# Patient Record
Sex: MALE | Race: WHITE | NOT HISPANIC OR LATINO | Employment: FULL TIME | URBAN - METROPOLITAN AREA
[De-identification: names, ages, dates, MRNs, and addresses within clinical notes are randomized per-mention and may not be internally consistent; named-entity substitution may affect disease eponyms.]

---

## 2019-10-16 ENCOUNTER — OFFICE VISIT CONVERTED (OUTPATIENT)
Dept: FAMILY MEDICINE CLINIC | Facility: CLINIC | Age: 49
End: 2019-10-16
Attending: NURSE PRACTITIONER

## 2019-10-16 ENCOUNTER — HOSPITAL ENCOUNTER (OUTPATIENT)
Dept: LAB | Facility: HOSPITAL | Age: 49
Discharge: HOME | End: 2019-10-16
Attending: NURSE PRACTITIONER

## 2019-10-16 ENCOUNTER — CONVERSION ENCOUNTER (OUTPATIENT)
Dept: FAMILY MEDICINE CLINIC | Facility: CLINIC | Age: 49
End: 2019-10-16

## 2019-10-16 LAB
ALBUMIN SERPL-MCNC: 4.5 G/DL (ref 3.5–5)
ALBUMIN/GLOB SERPL: 1.7 {RATIO} (ref 1.4–2.6)
ALP SERPL-CCNC: 85 U/L (ref 53–128)
ALT SERPL-CCNC: 25 U/L (ref 10–40)
ANION GAP SERPL CALC-SCNC: 19 MMOL/L (ref 8–19)
AST SERPL-CCNC: 26 U/L (ref 15–50)
BASOPHILS # BLD AUTO: 0.05 10*3/UL (ref 0–0.2)
BASOPHILS NFR BLD AUTO: 0.7 % (ref 0–3)
BILIRUB SERPL-MCNC: 0.55 MG/DL (ref 0.2–1.3)
BUN SERPL-MCNC: 15 MG/DL (ref 5–25)
BUN/CREAT SERPL: 17 {RATIO} (ref 6–20)
CALCIUM SERPL-MCNC: 9.7 MG/DL (ref 8.7–10.4)
CHLORIDE SERPL-SCNC: 103 MMOL/L (ref 99–111)
CHOLEST SERPL-MCNC: 233 MG/DL (ref 107–200)
CHOLEST/HDLC SERPL: 5.2 {RATIO} (ref 3–6)
CONV ABS IMM GRAN: 0.03 10*3/UL (ref 0–0.2)
CONV CO2: 23 MMOL/L (ref 22–32)
CONV IMMATURE GRAN: 0.4 % (ref 0–1.8)
CONV TOTAL PROTEIN: 7.1 G/DL (ref 6.3–8.2)
CREAT UR-MCNC: 0.86 MG/DL (ref 0.7–1.2)
DEPRECATED RDW RBC AUTO: 40.6 FL (ref 35.1–43.9)
EOSINOPHIL # BLD AUTO: 0.24 10*3/UL (ref 0–0.7)
EOSINOPHIL # BLD AUTO: 3.4 % (ref 0–7)
ERYTHROCYTE [DISTWIDTH] IN BLOOD BY AUTOMATED COUNT: 12.5 % (ref 11.6–14.4)
GFR SERPLBLD BASED ON 1.73 SQ M-ARVRAT: >60 ML/MIN/{1.73_M2}
GLOBULIN UR ELPH-MCNC: 2.6 G/DL (ref 2–3.5)
GLUCOSE SERPL-MCNC: 89 MG/DL (ref 70–99)
HCT VFR BLD AUTO: 46.2 % (ref 42–52)
HDLC SERPL-MCNC: 45 MG/DL (ref 40–60)
HGB BLD-MCNC: 15.1 G/DL (ref 14–18)
LDLC SERPL CALC-MCNC: 145 MG/DL (ref 70–100)
LYMPHOCYTES # BLD AUTO: 2.53 10*3/UL (ref 1–5)
LYMPHOCYTES NFR BLD AUTO: 36.1 % (ref 20–45)
MCH RBC QN AUTO: 29 PG (ref 27–31)
MCHC RBC AUTO-ENTMCNC: 32.7 G/DL (ref 33–37)
MCV RBC AUTO: 88.8 FL (ref 80–96)
MONOCYTES # BLD AUTO: 0.56 10*3/UL (ref 0.2–1.2)
MONOCYTES NFR BLD AUTO: 8 % (ref 3–10)
NEUTROPHILS # BLD AUTO: 3.59 10*3/UL (ref 2–8)
NEUTROPHILS NFR BLD AUTO: 51.4 % (ref 30–85)
NRBC CBCN: 0 % (ref 0–0.7)
OSMOLALITY SERPL CALC.SUM OF ELEC: 292 MOSM/KG (ref 273–304)
PLATELET # BLD AUTO: 241 10*3/UL (ref 130–400)
PMV BLD AUTO: 10.5 FL (ref 9.4–12.4)
POTASSIUM SERPL-SCNC: 4.2 MMOL/L (ref 3.5–5.3)
PSA SERPL-MCNC: 1.32 NG/ML (ref 0–4)
RBC # BLD AUTO: 5.2 10*6/UL (ref 4.7–6.1)
SODIUM SERPL-SCNC: 141 MMOL/L (ref 135–147)
TRIGL SERPL-MCNC: 213 MG/DL (ref 40–150)
VLDLC SERPL-MCNC: 43 MG/DL (ref 5–37)
WBC # BLD AUTO: 7 10*3/UL (ref 4.8–10.8)

## 2019-10-17 ENCOUNTER — HOSPITAL ENCOUNTER (OUTPATIENT)
Dept: MRI IMAGING | Facility: HOSPITAL | Age: 49
Discharge: HOME | End: 2019-10-17
Attending: NURSE PRACTITIONER

## 2019-10-25 ENCOUNTER — OFFICE VISIT CONVERTED (OUTPATIENT)
Dept: ORTHOPEDIC SURGERY | Facility: CLINIC | Age: 49
End: 2019-10-25
Attending: ORTHOPAEDIC SURGERY

## 2020-03-02 ENCOUNTER — OFFICE VISIT CONVERTED (OUTPATIENT)
Dept: FAMILY MEDICINE CLINIC | Facility: CLINIC | Age: 50
End: 2020-03-02
Attending: NURSE PRACTITIONER

## 2020-03-02 ENCOUNTER — CONVERSION ENCOUNTER (OUTPATIENT)
Dept: FAMILY MEDICINE CLINIC | Facility: CLINIC | Age: 50
End: 2020-03-02

## 2020-03-11 ENCOUNTER — HOSPITAL ENCOUNTER (OUTPATIENT)
Dept: GENERAL RADIOLOGY | Facility: HOSPITAL | Age: 50
Discharge: HOME | End: 2020-03-11
Attending: NURSE PRACTITIONER

## 2020-03-24 ENCOUNTER — OFFICE VISIT CONVERTED (OUTPATIENT)
Dept: ORTHOPEDIC SURGERY | Facility: CLINIC | Age: 50
End: 2020-03-24
Attending: ORTHOPAEDIC SURGERY

## 2020-06-09 ENCOUNTER — OFFICE VISIT CONVERTED (OUTPATIENT)
Dept: ORTHOPEDIC SURGERY | Facility: CLINIC | Age: 50
End: 2020-06-09
Attending: ORTHOPAEDIC SURGERY

## 2021-04-13 ENCOUNTER — HOSPITAL ENCOUNTER (OUTPATIENT)
Dept: VACCINE CLINIC | Facility: HOSPITAL | Age: 51
Discharge: HOME | End: 2021-04-13
Attending: INTERNAL MEDICINE

## 2021-05-04 ENCOUNTER — HOSPITAL ENCOUNTER (OUTPATIENT)
Dept: VACCINE CLINIC | Facility: HOSPITAL | Age: 51
Discharge: HOME | End: 2021-05-04
Attending: INTERNAL MEDICINE

## 2021-05-13 NOTE — PROGRESS NOTES
Progress Note      Patient Name: Mike Patel   Patient ID: [id number]   Sex: Male   YOB: 1970    Primary Care Provider: Katherine TOVAR   Referring Provider: Katherine TOVAR    Visit Date: June 9, 2020    Provider: Gera Canada MD   Location: [facility]   Location Address: [address] [place] [address]  [id number]   Location Phone: [phone]          Chief Complaint  · Follow up Left Shoulder Pain      History Of Present Illness  Mike Patel is a 50 year old /White male who presents today to [facility].      He's following up for left shoulder pain. Patient injured left biceps in October 2019 when he felt a pop changing a car filter. Patient received a left shoulder steroid injection on 3/24/2020 that provided pain relief. Patient states some pain with physician directed home exercises, but overall patient states he's improved. Patient is taking Voltaren. He works at [employer].            Past Medical History  Arthritis; Chest pain; Seasonal allergies         Past Surgical History  Hernia         Medication List  diclofenac sodium 75 mg oral tablet,delayed release (DR/EC); ibuprofen 800 mg oral tablet; loratadine 10 mg oral tablet; multivitamin oral capsule         Allergy List  amoxicillin         Family Medical History  Hodgskin's Lymphoma; *No Known Family History         Social History  Alcohol (Current some day); Alcohol Use; lives with children; lives with spouse; ; .; Recreational Drug Use (Never); Tobacco (Former); Working         Immunizations  Name Date Admin   Influenza          Review of Systems  · Constitutional  o Denies  o : fever, chills, weight loss  · Cardiovascular  o Denies  o : chest pain, shortness of breath  · Gastrointestinal  o Denies  o : liver disease, heartburn, nausea, blood in stools  · Genitourinary  o Denies  o : painful urination, blood in urine  · Integument  o Denies  o : rash, itching  · Neurologic  o Denies  o :  "headache, weakness, loss of consciousness  · Musculoskeletal  o Admits  o : painful, swollen joints  · Psychiatric  o Denies  o : drug/alcohol addiction, anxiety, depression      Vitals  Date Time BP Position Site L\R Cuff Size HR RR TEMP (F) WT  HT  BMI kg/m2 BSA m2 O2 Sat HC       06/09/2020 08:57 AM      79 - R   191lbs 6oz 5'  7\" 29.97 2.03 98 %          Physical Examination  · Constitutional  o Appearance  o : well developed, well-nourished, no obvious deformities present  · Head and Face  o Head  o :   § Inspection  § : normocephalic  o Face  o :   § Inspection  § : no facial lesions  · Eyes  o Conjunctivae  o : conjunctivae normal  o Sclerae  o : sclerae white  · Ears, Nose, Mouth and Throat  o Ears  o :   § External Ears  § : appearance within normal limits  § Hearing  § : intact  o Nose  o :   § External Nose  § : appearance normal  · Neck  o Inspection/Palpation  o : normal appearance  o Range of Motion  o : full range of motion  · Respiratory  o Respiratory Effort  o : breathing unlabored  o Inspection of Chest  o : normal appearance  o Auscultation of Lungs  o : no audible wheezing or rales  · Cardiovascular  o Heart  o : regular rate  · Gastrointestinal  o Abdominal Examination  o : soft and non-tender  · Skin and Subcutaneous Tissue  o General Inspection  o : intact, no rashes  · Psychiatric  o General  o : Alert and oriented x3  o Judgement and Insight  o : judgment and insight intact  o Mood and Affect  o : mood normal, affect appropriate  · Left Shoulder  o Inspection  o : Forward elevation is near-full. Limited ER with stiffness. Pain with ER. Neurovascularly intact. Sensation grossly intact. Pulses normal.   · Imaging  o Imaging  o : Left shoulder MRI performed on 3/11/2020 revealed 1) Evidence for partial thickness undersurface and intrasubstance tear involving the superior fibers of the subscapularis component of the cuff. No definitive full-thickness rotator cuff tear is seen. 2) There is " complete tear of the biceps tendon likely occurring at the biceps labral complex. There is distal retraction of the torn fibers below the intertubercular sulcus. 3) Evidence for chronic or degenerative type changes throughout the labrum. There is associated mild osteoarthritis of the glenohumeral joint. 4) Findings most suggestive of changes related to adhesive capsulitis. Findings secondary to prior trauma and partial injury of the inferior glenohumeral ligament and joint capsule could also be considered.           Assessment  · Left shoulder pain, unspecified chronicity     719.41/M25.512      Plan  · Medications  o Medications have been Reconciled  o Transition of Care or Provider Policy  · Instructions  o Reviewed the patient's Past Medical, Social, and Family history as well as the ROS at today's visit, no changes.  o Call or return if worsening symptoms.  o The above service was scribed by Jennifer Hines on my behalf and I attest to the accuracy of the note. mc  o The plan is to continue home exercises and Voltaren. Follow-up PRN.   · Referrals  o ID: [id number] Date: 10/24/2019 Type: Inbound  Specialty: Orthopedic Surgery            Electronically Signed by: Audrey Hines - , Other -Author on June 9, 2020 09:10:02 AM  Electronically Co-signed by: Gera Canada MD -Reviewer on June 9, 2020 10:08:30 AM

## 2021-05-15 VITALS
OXYGEN SATURATION: 97 % | DIASTOLIC BLOOD PRESSURE: 77 MMHG | SYSTOLIC BLOOD PRESSURE: 108 MMHG | BODY MASS INDEX: 29.58 KG/M2 | HEART RATE: 87 BPM | WEIGHT: 188.5 LBS | HEIGHT: 67 IN

## 2021-05-15 VITALS — HEART RATE: 87 BPM | OXYGEN SATURATION: 96 % | HEIGHT: 67 IN | BODY MASS INDEX: 30.69 KG/M2 | WEIGHT: 195.5 LBS

## 2021-05-15 VITALS
SYSTOLIC BLOOD PRESSURE: 122 MMHG | WEIGHT: 188 LBS | HEART RATE: 68 BPM | OXYGEN SATURATION: 98 % | DIASTOLIC BLOOD PRESSURE: 87 MMHG | HEIGHT: 67 IN | BODY MASS INDEX: 29.51 KG/M2

## 2021-05-15 VITALS — HEIGHT: 67 IN | BODY MASS INDEX: 29.51 KG/M2 | WEIGHT: 188 LBS

## 2021-05-15 VITALS — WEIGHT: 191.37 LBS | HEART RATE: 79 BPM | HEIGHT: 67 IN | BODY MASS INDEX: 30.03 KG/M2 | OXYGEN SATURATION: 98 %

## 2021-06-17 ENCOUNTER — OFFICE VISIT (OUTPATIENT)
Dept: FAMILY MEDICINE CLINIC | Facility: CLINIC | Age: 51
End: 2021-06-17

## 2021-06-17 ENCOUNTER — LAB (OUTPATIENT)
Dept: LAB | Facility: HOSPITAL | Age: 51
End: 2021-06-17

## 2021-06-17 VITALS
SYSTOLIC BLOOD PRESSURE: 121 MMHG | DIASTOLIC BLOOD PRESSURE: 88 MMHG | WEIGHT: 194 LBS | OXYGEN SATURATION: 96 % | TEMPERATURE: 96.9 F | HEIGHT: 67 IN | BODY MASS INDEX: 30.45 KG/M2 | HEART RATE: 60 BPM

## 2021-06-17 DIAGNOSIS — Z12.11 SCREENING FOR COLON CANCER: Primary | ICD-10-CM

## 2021-06-17 DIAGNOSIS — Z23 NEED FOR TETANUS BOOSTER: ICD-10-CM

## 2021-06-17 DIAGNOSIS — Z13.29 SCREENING FOR THYROID DISORDER: ICD-10-CM

## 2021-06-17 DIAGNOSIS — Z01.89 ROUTINE LAB DRAW: ICD-10-CM

## 2021-06-17 DIAGNOSIS — Z13.220 SCREENING FOR LIPID DISORDERS: ICD-10-CM

## 2021-06-17 DIAGNOSIS — Z12.5 SCREENING FOR PROSTATE CANCER: ICD-10-CM

## 2021-06-17 LAB
25(OH)D3 SERPL-MCNC: 38.4 NG/ML
ALBUMIN SERPL-MCNC: 4.4 G/DL (ref 3.5–5.2)
ALBUMIN/GLOB SERPL: 2.3 G/DL
ALP SERPL-CCNC: 93 U/L (ref 39–117)
ALT SERPL W P-5'-P-CCNC: 21 U/L (ref 1–41)
ANION GAP SERPL CALCULATED.3IONS-SCNC: 6.8 MMOL/L (ref 5–15)
AST SERPL-CCNC: 24 U/L (ref 1–40)
BASOPHILS # BLD AUTO: 0.06 10*3/MM3 (ref 0–0.2)
BASOPHILS NFR BLD AUTO: 0.9 % (ref 0–1.5)
BILIRUB SERPL-MCNC: 0.5 MG/DL (ref 0–1.2)
BUN SERPL-MCNC: 15 MG/DL (ref 6–20)
BUN/CREAT SERPL: 17.2 (ref 7–25)
CALCIUM SPEC-SCNC: 8.7 MG/DL (ref 8.6–10.5)
CHLORIDE SERPL-SCNC: 106 MMOL/L (ref 98–107)
CHOLEST SERPL-MCNC: 230 MG/DL (ref 0–200)
CO2 SERPL-SCNC: 25.2 MMOL/L (ref 22–29)
CREAT SERPL-MCNC: 0.87 MG/DL (ref 0.76–1.27)
DEPRECATED RDW RBC AUTO: 41 FL (ref 37–54)
EOSINOPHIL # BLD AUTO: 0.3 10*3/MM3 (ref 0–0.4)
EOSINOPHIL NFR BLD AUTO: 4.6 % (ref 0.3–6.2)
ERYTHROCYTE [DISTWIDTH] IN BLOOD BY AUTOMATED COUNT: 12.9 % (ref 12.3–15.4)
GFR SERPL CREATININE-BSD FRML MDRD: 93 ML/MIN/1.73
GLOBULIN UR ELPH-MCNC: 1.9 GM/DL
GLUCOSE SERPL-MCNC: 82 MG/DL (ref 65–99)
HCT VFR BLD AUTO: 43.9 % (ref 37.5–51)
HDLC SERPL-MCNC: 43 MG/DL (ref 40–60)
HGB BLD-MCNC: 14.8 G/DL (ref 13–17.7)
IMM GRANULOCYTES # BLD AUTO: 0.02 10*3/MM3 (ref 0–0.05)
IMM GRANULOCYTES NFR BLD AUTO: 0.3 % (ref 0–0.5)
LDLC SERPL CALC-MCNC: 168 MG/DL (ref 0–100)
LDLC/HDLC SERPL: 3.86 {RATIO}
LYMPHOCYTES # BLD AUTO: 2.49 10*3/MM3 (ref 0.7–3.1)
LYMPHOCYTES NFR BLD AUTO: 38.5 % (ref 19.6–45.3)
MCH RBC QN AUTO: 29.3 PG (ref 26.6–33)
MCHC RBC AUTO-ENTMCNC: 33.7 G/DL (ref 31.5–35.7)
MCV RBC AUTO: 86.9 FL (ref 79–97)
MONOCYTES # BLD AUTO: 0.52 10*3/MM3 (ref 0.1–0.9)
MONOCYTES NFR BLD AUTO: 8 % (ref 5–12)
NEUTROPHILS NFR BLD AUTO: 3.07 10*3/MM3 (ref 1.7–7)
NEUTROPHILS NFR BLD AUTO: 47.7 % (ref 42.7–76)
NRBC BLD AUTO-RTO: 0 /100 WBC (ref 0–0.2)
PLATELET # BLD AUTO: 227 10*3/MM3 (ref 140–450)
PMV BLD AUTO: 10.2 FL (ref 6–12)
POTASSIUM SERPL-SCNC: 4.2 MMOL/L (ref 3.5–5.2)
PROT SERPL-MCNC: 6.3 G/DL (ref 6–8.5)
PSA SERPL-MCNC: 1.2 NG/ML (ref 0–4)
RBC # BLD AUTO: 5.05 10*6/MM3 (ref 4.14–5.8)
SODIUM SERPL-SCNC: 138 MMOL/L (ref 136–145)
TRIGL SERPL-MCNC: 106 MG/DL (ref 0–150)
TSH SERPL DL<=0.05 MIU/L-ACNC: 3.07 UIU/ML (ref 0.27–4.2)
VIT B12 BLD-MCNC: 820 PG/ML (ref 211–946)
VLDLC SERPL-MCNC: 19 MG/DL (ref 5–40)
WBC # BLD AUTO: 6.46 10*3/MM3 (ref 3.4–10.8)

## 2021-06-17 PROCEDURE — 84443 ASSAY THYROID STIM HORMONE: CPT

## 2021-06-17 PROCEDURE — 99396 PREV VISIT EST AGE 40-64: CPT | Performed by: NURSE PRACTITIONER

## 2021-06-17 PROCEDURE — 90471 IMMUNIZATION ADMIN: CPT | Performed by: NURSE PRACTITIONER

## 2021-06-17 PROCEDURE — 80061 LIPID PANEL: CPT

## 2021-06-17 PROCEDURE — 85025 COMPLETE CBC W/AUTO DIFF WBC: CPT

## 2021-06-17 PROCEDURE — 90715 TDAP VACCINE 7 YRS/> IM: CPT | Performed by: NURSE PRACTITIONER

## 2021-06-17 PROCEDURE — 80053 COMPREHEN METABOLIC PANEL: CPT

## 2021-06-17 PROCEDURE — 82306 VITAMIN D 25 HYDROXY: CPT

## 2021-06-17 PROCEDURE — G0103 PSA SCREENING: HCPCS

## 2021-06-17 PROCEDURE — 82607 VITAMIN B-12: CPT

## 2021-06-17 PROCEDURE — 36415 COLL VENOUS BLD VENIPUNCTURE: CPT

## 2021-06-17 RX ORDER — LORATADINE 10 MG/1
TABLET ORAL
COMMUNITY

## 2021-06-17 RX ORDER — MULTIVIT WITH MINERALS/LUTEIN
250 TABLET ORAL DAILY
COMMUNITY

## 2021-06-17 NOTE — PROGRESS NOTES
Chris Patel is a 51 y.o. male and is here for a comprehensive physical exam. The patient reports no problems.    Do you take any herbs or supplements that were not prescribed by a doctor? no  Are you taking calcium supplements? no  Are you taking aspirin daily? no     History:  Patient receives prostate care outside our clinic  Date last prostate exam: never  Date last PSA: 2019    The following portions of the patient's history were reviewed and updated as appropriate:   He  has a past medical history of Arthritis, Chest pain, Seasonal allergies, and Shoulder joint derangement (2019).  He does not have a problem list on file.  He  has a past surgical history that includes Hernia repair (1976).  His family history includes COPD in his maternal grandfather and mother; Hodgkin's lymphoma in his maternal grandmother.  He  reports that he has quit smoking. He quit smokeless tobacco use about 4 years ago.  His smokeless tobacco use included chew. He reports current alcohol use. He reports previous drug use.  Current Outpatient Medications   Medication Sig Dispense Refill   • vitamin C (ASCORBIC ACID) 250 MG tablet Take 250 mg by mouth Daily.     • loratadine (CLARITIN) 10 MG tablet loratadine 10 mg oral tablet take 1 tablet (10 mg) by oral route once daily   Active     • Multiple Vitamins-Minerals (MULTIVITAMIN GUMMIES MENS PO) multivitamin oral capsule take 1 capsule by oral route daily   Active       No current facility-administered medications for this visit.     Current Outpatient Medications on File Prior to Visit   Medication Sig   • vitamin C (ASCORBIC ACID) 250 MG tablet Take 250 mg by mouth Daily.   • loratadine (CLARITIN) 10 MG tablet loratadine 10 mg oral tablet take 1 tablet (10 mg) by oral route once daily   Active   • Multiple Vitamins-Minerals (MULTIVITAMIN GUMMIES MENS PO) multivitamin oral capsule take 1 capsule by oral route daily   Active     No current facility-administered  "medications on file prior to visit.     He is allergic to amoxicillin..    Review of Systems  Do you have pain that bothers you in your daily life? no  A comprehensive review of systems was negative.    Objective   /88 (BP Location: Left arm, Patient Position: Sitting, Cuff Size: Adult)   Pulse 60   Temp 96.9 °F (36.1 °C)   Ht 170.2 cm (67\")   Wt 88 kg (194 lb)   SpO2 96%   BMI 30.38 kg/m²     General Appearance:    Alert, cooperative, no distress, appears stated age   Head:    Normocephalic, without obvious abnormality, atraumatic   Eyes:    PERRL, conjunctiva/corneas clear, EOM's intact, fundi     benign, both eyes        Ears:    Normal TM's and external ear canals, both ears   Nose:   Nares normal, septum midline, mucosa normal, no drainage    or sinus tenderness   Throat:   Lips, mucosa, and tongue normal; teeth and gums normal   Neck:   Supple, symmetrical, trachea midline, no adenopathy;        thyroid:  No enlargement/tenderness/nodules; no carotid    bruit or JVD   Back:     Symmetric, no curvature, ROM normal, no CVA tenderness   Lungs:     Clear to auscultation bilaterally, respirations unlabored   Chest wall:    No tenderness or deformity   Heart:    Regular rate and rhythm, S1 and S2 normal, no murmur, rub   or gallop   Abdomen:     Soft, non-tender, bowel sounds active all four quadrants,     no masses, no organomegaly   Genitalia:    Normal male without lesion, discharge or tenderness   Rectal:    Normal tone, normal prostate, no masses or tenderness;    guaiac negative stool   Extremities:   Extremities normal, atraumatic, no cyanosis or edema   Pulses:   2+ and symmetric all extremities   Skin:   Skin color, texture, turgor normal, no rashes or lesions   Lymph nodes:   Cervical, supraclavicular, and axillary nodes normal   Neurologic:   CNII-XII intact. Normal strength, sensation and reflexes       throughout        Assessment/Plan   Healthy male exam.     1. Due annual labs-ordered " today.  2. Patient Counseling:  --Nutrition: Stressed importance of moderation in sodium/caffeine intake, saturated fat and cholesterol, caloric balance, sufficient intake of fresh fruits, vegetables, fiber, calcium, iron, and 1 mg of folate supplement per day (for females capable of pregnancy).  --Exercise: Stressed the importance of regular exercise.   --Substance Abuse: Discussed cessation/primary prevention of tobacco, alcohol, or other drug use; driving or other dangerous activities under the influence; availability of treatment for abuse.    --Sexuality: Discussed sexually transmitted diseases, partner selection, use of condoms, avoidance of unintended pregnancy  and contraceptive alternatives.   --Injury prevention: Discussed safety belts, safety helmets, smoke detector, smoking near bedding or upholstery.   --Dental health: Discussed importance of regular tooth brushing, flossing, and dental visits.  --Immunizations reviewed.  --Discussed benefits of screening colonoscopy.  --After hours service discussed with patient    3. Discussed the patient's BMI with him.  The BMI is above average; BMI management plan is completed  4. Follow up next physical in 1 year

## 2021-06-18 ENCOUNTER — TELEPHONE (OUTPATIENT)
Dept: FAMILY MEDICINE CLINIC | Facility: CLINIC | Age: 51
End: 2021-06-18

## 2021-06-18 DIAGNOSIS — E78.00 HIGH CHOLESTEROL: Primary | ICD-10-CM

## 2021-06-18 RX ORDER — ATORVASTATIN CALCIUM 10 MG/1
10 TABLET, FILM COATED ORAL DAILY
Qty: 30 TABLET | Refills: 3 | Status: SHIPPED | OUTPATIENT
Start: 2021-06-18 | End: 2022-03-07 | Stop reason: DRUGHIGH

## 2021-06-18 NOTE — TELEPHONE ENCOUNTER
----- Message from GUY Sierra sent at 6/17/2021 10:15 PM EDT -----  All labs normal w/exception of lipid-showed elevated chol and ldl-recommend starting lipitor 10mg qd and repeat lipid in 3m

## 2021-06-18 NOTE — TELEPHONE ENCOUNTER
Caller: Mike Patel    Relationship: Self    Best call back number [phone]    What medication are you requesting: LIPITOR    If a prescription is needed, what is your preferred pharmacy and phone number: [facility] [place] - [address] AT [facility] [address] - [phone]  - [phone] FX     Additional notes:PATIENT STATED THAT HE HAD A VISIT WITH GUY FLORES YESTERDAY AND THAT HE WAS PRESCRIBED LIPITOR. PLEASE CALL [facility] WHEN THIS PRESCRIPTION HAS BEEN SENT TO :    [facility] [place] - [address] AT [facility] [address] - [phone]  - [phone] FX

## 2021-10-01 ENCOUNTER — TELEPHONE (OUTPATIENT)
Dept: FAMILY MEDICINE CLINIC | Facility: CLINIC | Age: 51
End: 2021-10-01

## 2021-12-23 ENCOUNTER — TELEPHONE (OUTPATIENT)
Dept: FAMILY MEDICINE CLINIC | Facility: CLINIC | Age: 51
End: 2021-12-23

## 2022-03-04 ENCOUNTER — HOSPITAL ENCOUNTER (OUTPATIENT)
Dept: GENERAL RADIOLOGY | Facility: HOSPITAL | Age: 52
Discharge: HOME | End: 2022-03-04
Admitting: NURSE PRACTITIONER

## 2022-03-04 ENCOUNTER — OFFICE VISIT (OUTPATIENT)
Dept: FAMILY MEDICINE CLINIC | Facility: CLINIC | Age: 52
End: 2022-03-04

## 2022-03-04 ENCOUNTER — LAB (OUTPATIENT)
Dept: LAB | Facility: HOSPITAL | Age: 52
End: 2022-03-04

## 2022-03-04 VITALS
OXYGEN SATURATION: 98 % | DIASTOLIC BLOOD PRESSURE: 77 MMHG | SYSTOLIC BLOOD PRESSURE: 113 MMHG | BODY MASS INDEX: 29.95 KG/M2 | HEIGHT: 67 IN | WEIGHT: 190.8 LBS | HEART RATE: 69 BPM

## 2022-03-04 DIAGNOSIS — Z13.29 SCREENING FOR THYROID DISORDER: ICD-10-CM

## 2022-03-04 DIAGNOSIS — M79.644 PAIN OF RIGHT THUMB: ICD-10-CM

## 2022-03-04 DIAGNOSIS — Z01.89 ROUTINE LAB DRAW: ICD-10-CM

## 2022-03-04 DIAGNOSIS — M25.50 ARTHRALGIA, UNSPECIFIED JOINT: ICD-10-CM

## 2022-03-04 DIAGNOSIS — E78.00 HIGH CHOLESTEROL: ICD-10-CM

## 2022-03-04 DIAGNOSIS — E78.00 HIGH CHOLESTEROL: Primary | ICD-10-CM

## 2022-03-04 DIAGNOSIS — M65.4 TENDINITIS, DE QUERVAIN'S: ICD-10-CM

## 2022-03-04 PROBLEM — M19.90 ARTHRITIS: Status: ACTIVE | Noted: 2022-03-04

## 2022-03-04 PROBLEM — J30.2 SEASONAL ALLERGIC RHINITIS: Status: ACTIVE | Noted: 2022-03-04

## 2022-03-04 LAB
ALBUMIN SERPL-MCNC: 4.7 G/DL (ref 3.5–5.2)
ALBUMIN/GLOB SERPL: 1.7 G/DL
ALP SERPL-CCNC: 103 U/L (ref 39–117)
ALT SERPL W P-5'-P-CCNC: 41 U/L (ref 1–41)
ANION GAP SERPL CALCULATED.3IONS-SCNC: 11 MMOL/L (ref 5–15)
AST SERPL-CCNC: 29 U/L (ref 1–40)
BASOPHILS # BLD AUTO: 0.05 10*3/MM3 (ref 0–0.2)
BASOPHILS NFR BLD AUTO: 0.9 % (ref 0–1.5)
BILIRUB SERPL-MCNC: 0.5 MG/DL (ref 0–1.2)
BUN SERPL-MCNC: 17 MG/DL (ref 6–20)
BUN/CREAT SERPL: 17.9 (ref 7–25)
CALCIUM SPEC-SCNC: 9.6 MG/DL (ref 8.6–10.5)
CHLORIDE SERPL-SCNC: 104 MMOL/L (ref 98–107)
CHOLEST SERPL-MCNC: 224 MG/DL (ref 0–200)
CHROMATIN AB SERPL-ACNC: <10 IU/ML (ref 0–14)
CO2 SERPL-SCNC: 25 MMOL/L (ref 22–29)
CREAT SERPL-MCNC: 0.95 MG/DL (ref 0.76–1.27)
CRP SERPL-MCNC: <0.3 MG/DL (ref 0–0.5)
DEPRECATED RDW RBC AUTO: 40.6 FL (ref 37–54)
EGFRCR SERPLBLD CKD-EPI 2021: 96.9 ML/MIN/1.73
EOSINOPHIL # BLD AUTO: 0.21 10*3/MM3 (ref 0–0.4)
EOSINOPHIL NFR BLD AUTO: 3.6 % (ref 0.3–6.2)
ERYTHROCYTE [DISTWIDTH] IN BLOOD BY AUTOMATED COUNT: 12.1 % (ref 12.3–15.4)
ERYTHROCYTE [SEDIMENTATION RATE] IN BLOOD: 12 MM/HR (ref 0–20)
GLOBULIN UR ELPH-MCNC: 2.7 GM/DL
GLUCOSE SERPL-MCNC: 85 MG/DL (ref 65–99)
HCT VFR BLD AUTO: 48.1 % (ref 37.5–51)
HDLC SERPL-MCNC: 45 MG/DL (ref 40–60)
HGB BLD-MCNC: 15.9 G/DL (ref 13–17.7)
IMM GRANULOCYTES # BLD AUTO: 0.01 10*3/MM3 (ref 0–0.05)
IMM GRANULOCYTES NFR BLD AUTO: 0.2 % (ref 0–0.5)
LDLC SERPL CALC-MCNC: 149 MG/DL (ref 0–100)
LDLC/HDLC SERPL: 3.24 {RATIO}
LYMPHOCYTES # BLD AUTO: 2.12 10*3/MM3 (ref 0.7–3.1)
LYMPHOCYTES NFR BLD AUTO: 36.2 % (ref 19.6–45.3)
MCH RBC QN AUTO: 29.6 PG (ref 26.6–33)
MCHC RBC AUTO-ENTMCNC: 33.1 G/DL (ref 31.5–35.7)
MCV RBC AUTO: 89.4 FL (ref 79–97)
MONOCYTES # BLD AUTO: 0.54 10*3/MM3 (ref 0.1–0.9)
MONOCYTES NFR BLD AUTO: 9.2 % (ref 5–12)
NEUTROPHILS NFR BLD AUTO: 2.93 10*3/MM3 (ref 1.7–7)
NEUTROPHILS NFR BLD AUTO: 49.9 % (ref 42.7–76)
NRBC BLD AUTO-RTO: 0 /100 WBC (ref 0–0.2)
PLATELET # BLD AUTO: 241 10*3/MM3 (ref 140–450)
PMV BLD AUTO: 11 FL (ref 6–12)
POTASSIUM SERPL-SCNC: 4.5 MMOL/L (ref 3.5–5.2)
PROT SERPL-MCNC: 7.4 G/DL (ref 6–8.5)
RBC # BLD AUTO: 5.38 10*6/MM3 (ref 4.14–5.8)
SODIUM SERPL-SCNC: 140 MMOL/L (ref 136–145)
TRIGL SERPL-MCNC: 165 MG/DL (ref 0–150)
TSH SERPL DL<=0.05 MIU/L-ACNC: 2.66 UIU/ML (ref 0.27–4.2)
URATE SERPL-MCNC: 6.2 MG/DL (ref 3.4–7)
VLDLC SERPL-MCNC: 30 MG/DL (ref 5–40)
WBC NRBC COR # BLD: 5.86 10*3/MM3 (ref 3.4–10.8)

## 2022-03-04 PROCEDURE — 73130 X-RAY EXAM OF HAND: CPT

## 2022-03-04 PROCEDURE — 85025 COMPLETE CBC W/AUTO DIFF WBC: CPT

## 2022-03-04 PROCEDURE — 36415 COLL VENOUS BLD VENIPUNCTURE: CPT

## 2022-03-04 PROCEDURE — 99214 OFFICE O/P EST MOD 30 MIN: CPT | Performed by: NURSE PRACTITIONER

## 2022-03-04 PROCEDURE — 80061 LIPID PANEL: CPT

## 2022-03-04 PROCEDURE — 86140 C-REACTIVE PROTEIN: CPT

## 2022-03-04 PROCEDURE — 85652 RBC SED RATE AUTOMATED: CPT

## 2022-03-04 PROCEDURE — 84550 ASSAY OF BLOOD/URIC ACID: CPT

## 2022-03-04 PROCEDURE — 80053 COMPREHEN METABOLIC PANEL: CPT

## 2022-03-04 PROCEDURE — 86200 CCP ANTIBODY: CPT

## 2022-03-04 PROCEDURE — 86431 RHEUMATOID FACTOR QUANT: CPT

## 2022-03-04 PROCEDURE — 86225 DNA ANTIBODY NATIVE: CPT

## 2022-03-04 PROCEDURE — 84443 ASSAY THYROID STIM HORMONE: CPT

## 2022-03-04 PROCEDURE — 86038 ANTINUCLEAR ANTIBODIES: CPT

## 2022-03-04 NOTE — PROGRESS NOTES
"Chief Complaint  Hand Pain (Right thumb for last 2 weeks. Pt denies any injury)    Subjective          Mike Patel presents to [facility]  History of Present Illness     Pt states that his right thumb has been hurting over the last 2 weeks. Pt states that when his thumb hurts it starts out with dull pain then sharp pain 8/10, when in use like tying shoe laces or writing. Pt has even dropped a cup of coffee from the pain. Pt has been using OTC Tylenol and IBU for pain. States he even had some Diclofenac and it seemed to help a little. Reports he cannot even balance a check book-states he can use it for a bit but the more he uses it it becomes more painful. He is a  and states he uses his hands a lot and does a lot of repetitive motions. Discussed RICE (rest, ice compression, elevation) and NSAID. His symptoms correlate w/De Quervain's synovitis -prescribed Diclofenac 50mg bid and ordered labs and imaging to r/o other causes.     He  has a past medical history of Arthritis, Chest pain, Seasonal allergies, and Shoulder joint derangement (2019).     Objective   Vital Signs:   /77 (BP Location: Left arm, Patient Position: Sitting, Cuff Size: Adult)   Pulse 69   Ht 170.2 cm (67\")   Wt 86.5 kg (190 lb 12.8 oz)   SpO2 98%   BMI 29.88 kg/m²     Physical Exam  Constitutional:       Appearance: Normal appearance.   Cardiovascular:      Rate and Rhythm: Normal rate and regular rhythm.      Pulses: Normal pulses.      Heart sounds: Normal heart sounds.   Pulmonary:      Effort: Pulmonary effort is normal.      Breath sounds: Normal breath sounds.   Musculoskeletal:      Right wrist: Tenderness present. Decreased range of motion.      Comments: Positive Finkelstein/Eichhoff test   Skin:     General: Skin is warm and dry.   Neurological:      General: No focal deficit present.      Mental Status: He is alert and oriented to person, place, and time.   Psychiatric:         Mood " and Affect: Mood normal.         Behavior: Behavior normal.        Result Review :        Past Surgical History:   Procedure Laterality Date   • HERNIA REPAIR  1976      Family History   Problem Relation Age of Onset   • Hodgkin's lymphoma Maternal Grandmother    • COPD Mother    • COPD Maternal Grandfather         Current Outpatient Medications:   •  atorvastatin (Lipitor) 10 MG tablet, Take 1 tablet by mouth Daily., Disp: 30 tablet, Rfl: 3  •  diclofenac (VOLTAREN) 50 MG EC tablet, Take 1 tablet by mouth 2 (Two) Times a Day As Needed (pain)., Disp: 60 tablet, Rfl: 0  •  loratadine (CLARITIN) 10 MG tablet, loratadine 10 mg oral tablet take 1 tablet (10 mg) by oral route once daily   Active, Disp: , Rfl:   •  Multiple Vitamins-Minerals (MULTIVITAMIN GUMMIES MENS PO), multivitamin oral capsule take 1 capsule by oral route daily   Active, Disp: , Rfl:   •  vitamin C (ASCORBIC ACID) 250 MG tablet, Take 250 mg by mouth Daily., Disp: , Rfl:    Assessment and Plan    Diagnoses and all orders for this visit:    1. High cholesterol (Primary)  -     Lipid Panel; Future    2. Screening for thyroid disorder  -     TSH; Future  -     Comprehensive Metabolic Panel; Future    3. Routine lab draw    4. Arthralgia, unspecified joint  -     Uric Acid; Future  -     Cyclic Citrul Peptide Antibody, IgG / IgA; Future  -     Rheumatoid Factor; Future  -     ROMAIN; Future  -     C-reactive Protein; Future  -     Sedimentation Rate; Future  -     CBC & Differential; Future  -     diclofenac (VOLTAREN) 50 MG EC tablet; Take 1 tablet by mouth 2 (Two) Times a Day As Needed (pain).  Dispense: 60 tablet; Refill: 0    5. Pain of right thumb  -     XR Hand 3+ View Right; Future    6. Tendinitis, de Quervain's  Comments:  c/o right thumb pain x 2 wks-Discussed RICE and NSAIDs. Symptoms correlate w/De Quervain's synovitis-prescribed Diclofenac 50mg bid and ordered labs and imaging        Follow Up   Return for Next scheduled follow up.  Patient was  given instructions and counseling regarding his condition or for health maintenance advice. Please see specific information pulled into the AVS if appropriate.              GUY Sierra

## 2022-03-05 LAB
DSDNA IGG SERPL IA-ACNC: NEGATIVE [IU]/ML
NUCLEAR IGG SER IA-RTO: NEGATIVE

## 2022-03-06 LAB — CCP IGA+IGG SERPL IA-ACNC: 5 UNITS (ref 0–19)

## 2022-03-07 DIAGNOSIS — E78.00 HIGH CHOLESTEROL: Primary | ICD-10-CM

## 2022-03-07 RX ORDER — ATORVASTATIN CALCIUM 20 MG/1
20 TABLET, FILM COATED ORAL DAILY
Qty: 90 TABLET | Refills: 0 | Status: SHIPPED | OUTPATIENT
Start: 2022-03-07 | End: 2022-06-09 | Stop reason: SDUPTHER

## 2022-03-07 RX ORDER — ATORVASTATIN CALCIUM 10 MG/1
20 TABLET, FILM COATED ORAL DAILY
Qty: 30 TABLET | Refills: 2 | Status: CANCELLED | OUTPATIENT
Start: 2022-03-07

## 2022-03-07 NOTE — TELEPHONE ENCOUNTER
----- Message from GUY Sierra sent at 3/7/2022  2:35 PM EST -----  Inflammatory markers neg for autoimmune disorder, gout and RA. Lipid shows elevated chol, trig and ldl-increase lipitor to 20mg qd and recheck in 3m. CMP, tsh WNL

## 2022-04-12 ENCOUNTER — OFFICE VISIT (OUTPATIENT)
Dept: FAMILY MEDICINE CLINIC | Facility: CLINIC | Age: 52
End: 2022-04-12

## 2022-04-12 VITALS
BODY MASS INDEX: 30.51 KG/M2 | HEART RATE: 82 BPM | HEIGHT: 67 IN | DIASTOLIC BLOOD PRESSURE: 83 MMHG | OXYGEN SATURATION: 99 % | WEIGHT: 194.38 LBS | TEMPERATURE: 98.2 F | SYSTOLIC BLOOD PRESSURE: 115 MMHG

## 2022-04-12 DIAGNOSIS — M65.4 TENDINITIS, DE QUERVAIN'S: ICD-10-CM

## 2022-04-12 DIAGNOSIS — D22.9 ATYPICAL MOLE: Primary | ICD-10-CM

## 2022-04-12 DIAGNOSIS — M25.50 ARTHRALGIA, UNSPECIFIED JOINT: ICD-10-CM

## 2022-04-12 PROCEDURE — 99213 OFFICE O/P EST LOW 20 MIN: CPT | Performed by: NURSE PRACTITIONER

## 2022-04-12 NOTE — PROGRESS NOTES
"Chief Complaint  Follow-up (Thumb pain) and Suspicious Skin Lesion (Right side mole)    Subjective          Mike Patel presents to [facility]  History of Present Illness  Pt states he is here for refills of his medication.    Pt states that he was going to get his blood work done but it said that the lab order was for June. Discussed he needs to wait till June to have the lipid rechecked-he is staking lipitor 20mg qd.     Pt states he was seen for thumb pain last time and states that the pain is still there but significantly less. His last diclofenac on Thursday-reports he can actually  a cup of coffee without spilling it. States he can also write which was difficult before. He wore a thumb splint but it was too constricting. He tried ice a couple times but states he likes to be on the move.  Refilled Diclofec to take prn.     Pt also states that his wife noticed a mole on his shoulder on the right side of his back-he does have a flesh colored, asymmetric mole 9mm in size-referred to derm.     Objective   Vital Signs:   /83   Pulse 82   Temp 98.2 °F (36.8 °C)   Ht 170.2 cm (67\")   Wt 88.2 kg (194 lb 6 oz)   SpO2 99%   BMI 30.44 kg/m²           Physical Exam  Constitutional:       Appearance: Normal appearance.   Neck:      Thyroid: No thyroid mass, thyromegaly or thyroid tenderness.      Vascular: No carotid bruit.   Cardiovascular:      Rate and Rhythm: Normal rate and regular rhythm.      Pulses: Normal pulses.      Heart sounds: Normal heart sounds.   Pulmonary:      Effort: Pulmonary effort is normal.      Breath sounds: Normal breath sounds.   Musculoskeletal:      Right lower leg: No edema.      Left lower leg: No edema.   Skin:     General: Skin is warm and dry.             Comments: Flesh colored, asymmetric mole 9mm in size-referred to derm.      Neurological:      General: No focal deficit present.      Mental Status: He is alert and oriented to " person, place, and time.   Psychiatric:         Mood and Affect: Mood normal.         Behavior: Behavior normal.        Result Review :            Assessment and Plan    Diagnoses and all orders for this visit:    1. Atypical mole (Primary)  Comments:   flesh colored, asymmetric 9mm mole noted on the right upper back-referred to derm.     Orders:  -     Ambulatory Referral to Dermatology    2. Arthralgia, unspecified joint  -     diclofenac (VOLTAREN) 50 MG EC tablet; Take 1 tablet by mouth 2 (Two) Times a Day As Needed (pain).  Dispense: 60 tablet; Refill: 2    3. Tendinitis, de Quervain's  Comments:  tendonitis improved with diclofenac-reports not 100% improvement but significantly better than before-refilled Diclofenac        Follow Up   Return for Next scheduled follow up.  Patient was given instructions and counseling regarding his condition or for health maintenance advice. Please see specific information pulled into the AVS if appropriate.

## 2022-06-08 ENCOUNTER — LAB (OUTPATIENT)
Dept: LAB | Facility: HOSPITAL | Age: 52
End: 2022-06-08

## 2022-06-08 DIAGNOSIS — E78.00 HIGH CHOLESTEROL: ICD-10-CM

## 2022-06-08 LAB
CHOLEST SERPL-MCNC: 139 MG/DL (ref 0–200)
HDLC SERPL-MCNC: 45 MG/DL (ref 40–60)
LDLC SERPL CALC-MCNC: 71 MG/DL (ref 0–100)
LDLC/HDLC SERPL: 1.52 {RATIO}
TRIGL SERPL-MCNC: 129 MG/DL (ref 0–150)
VLDLC SERPL-MCNC: 23 MG/DL (ref 5–40)

## 2022-06-08 PROCEDURE — 36415 COLL VENOUS BLD VENIPUNCTURE: CPT

## 2022-06-08 PROCEDURE — 80061 LIPID PANEL: CPT

## 2022-06-09 ENCOUNTER — TELEPHONE (OUTPATIENT)
Dept: FAMILY MEDICINE CLINIC | Facility: CLINIC | Age: 52
End: 2022-06-09

## 2022-06-09 DIAGNOSIS — E78.00 HIGH CHOLESTEROL: ICD-10-CM

## 2022-06-09 RX ORDER — ATORVASTATIN CALCIUM 20 MG/1
20 TABLET, FILM COATED ORAL DAILY
Qty: 90 TABLET | Refills: 1 | Status: SHIPPED | OUTPATIENT
Start: 2022-06-09 | End: 2022-12-18

## 2022-06-09 NOTE — TELEPHONE ENCOUNTER
----- Message from GUY Sierra sent at 6/9/2022  1:00 PM EDT -----  His lipid is now normal continue the lipitor

## 2022-07-29 ENCOUNTER — OFFICE VISIT (OUTPATIENT)
Dept: FAMILY MEDICINE CLINIC | Facility: CLINIC | Age: 52
End: 2022-07-29

## 2022-07-29 VITALS
HEIGHT: 67 IN | DIASTOLIC BLOOD PRESSURE: 82 MMHG | OXYGEN SATURATION: 96 % | HEART RATE: 80 BPM | BODY MASS INDEX: 29.7 KG/M2 | WEIGHT: 189.2 LBS | SYSTOLIC BLOOD PRESSURE: 123 MMHG

## 2022-07-29 DIAGNOSIS — Z23 NEED FOR SHINGLES VACCINE: Primary | ICD-10-CM

## 2022-07-29 DIAGNOSIS — Z12.5 SCREENING FOR PROSTATE CANCER: ICD-10-CM

## 2022-07-29 DIAGNOSIS — Z11.59 ENCOUNTER FOR HEPATITIS C SCREENING TEST FOR LOW RISK PATIENT: ICD-10-CM

## 2022-07-29 PROCEDURE — 99396 PREV VISIT EST AGE 40-64: CPT | Performed by: NURSE PRACTITIONER

## 2022-08-10 ENCOUNTER — LAB (OUTPATIENT)
Dept: LAB | Facility: HOSPITAL | Age: 52
End: 2022-08-10

## 2022-08-10 DIAGNOSIS — Z12.5 SCREENING FOR PROSTATE CANCER: ICD-10-CM

## 2022-08-10 DIAGNOSIS — Z11.59 ENCOUNTER FOR HEPATITIS C SCREENING TEST FOR LOW RISK PATIENT: ICD-10-CM

## 2022-08-10 LAB
HCV AB SER DONR QL: NORMAL
PSA SERPL-MCNC: 1.06 NG/ML (ref 0–4)

## 2022-08-10 PROCEDURE — 86803 HEPATITIS C AB TEST: CPT

## 2022-08-10 PROCEDURE — G0103 PSA SCREENING: HCPCS

## 2022-08-10 PROCEDURE — 36415 COLL VENOUS BLD VENIPUNCTURE: CPT

## 2022-11-17 NOTE — TELEPHONE ENCOUNTER
Have him take it for another 3 months then recheck since he has not taken since Sept  
I called patient to remind him to complete repeat Lipid Panel.  He states that he completed the 3 months of Lipitor that were sent to his Pharm but has not taken since September - he did not realize that he was supposed to have repeat lab done and get refills on medication.  Do you still want him to repeat lab?  Or have patient re-start medication then repeat lab in 6 weeks?  
Message sent via My Chart  
never

## 2022-12-16 DIAGNOSIS — E78.00 HIGH CHOLESTEROL: ICD-10-CM

## 2022-12-18 RX ORDER — ATORVASTATIN CALCIUM 20 MG/1
TABLET, FILM COATED ORAL
Qty: 90 TABLET | Refills: 0 | Status: SHIPPED | OUTPATIENT
Start: 2022-12-18 | End: 2023-01-26 | Stop reason: SDUPTHER

## 2023-01-26 ENCOUNTER — OFFICE VISIT (OUTPATIENT)
Dept: FAMILY MEDICINE CLINIC | Facility: CLINIC | Age: 53
End: 2023-01-26
Payer: OTHER GOVERNMENT

## 2023-01-26 VITALS
WEIGHT: 194.2 LBS | DIASTOLIC BLOOD PRESSURE: 85 MMHG | BODY MASS INDEX: 30.48 KG/M2 | OXYGEN SATURATION: 98 % | SYSTOLIC BLOOD PRESSURE: 122 MMHG | HEIGHT: 67 IN | HEART RATE: 66 BPM

## 2023-01-26 DIAGNOSIS — L84 CORN OF FOOT: Primary | ICD-10-CM

## 2023-01-26 DIAGNOSIS — M25.50 ARTHRALGIA, UNSPECIFIED JOINT: ICD-10-CM

## 2023-01-26 DIAGNOSIS — Z13.29 SCREENING FOR THYROID DISORDER: ICD-10-CM

## 2023-01-26 DIAGNOSIS — E78.00 HIGH CHOLESTEROL: ICD-10-CM

## 2023-01-26 PROCEDURE — 99213 OFFICE O/P EST LOW 20 MIN: CPT

## 2023-01-26 RX ORDER — ATORVASTATIN CALCIUM 20 MG/1
20 TABLET, FILM COATED ORAL DAILY
Qty: 90 TABLET | Refills: 1 | Status: SHIPPED | OUTPATIENT
Start: 2023-01-26

## 2023-01-26 NOTE — PROGRESS NOTES
"Chief Complaint  Establish Care, Shoulder Pain (Left Shoulder pain ), and Foot Swelling (Patient would like referral for podiatry )    SUBJECTIVE  Mike Patel presents to [facility]    History of Present Illness  52-year-old Mike Bejarano presents today to establish care.  He is a previous patient of Katherine Moctezuma.     Foot pain: Patient states that he works on his feet a lot and has developed several corns on his feet he would like to have a referral to podiatry to have some of these removed.  He states he has tried over-the-counter products but they are not working for him.    High cholesterol: Patient is currently on atorvastatin 20 mg tablet daily he states he is doing well on this.  We will recheck labs in 6 months prior to our follow-up appointment.  No concerns at this time.          Past Medical History:   Diagnosis Date   • Arthritis    • Chest pain    • Seasonal allergies    • Shoulder joint derangement 2019      Family History   Problem Relation Age of Onset   • Hodgkin's lymphoma Maternal Grandmother    • COPD Mother    • COPD Maternal Grandfather       Past Surgical History:   Procedure Laterality Date   • HERNIA REPAIR  1976        Current Outpatient Medications:   •  atorvastatin (LIPITOR) 20 MG tablet, Take 1 tablet by mouth Daily., Disp: 90 tablet, Rfl: 1  •  diclofenac (VOLTAREN) 50 MG EC tablet, Take 1 tablet by mouth 2 (Two) Times a Day As Needed (pain)., Disp: 60 tablet, Rfl: 2  •  loratadine (CLARITIN) 10 MG tablet, loratadine 10 mg oral tablet take 1 tablet (10 mg) by oral route once daily   Active, Disp: , Rfl:   •  Multiple Vitamins-Minerals (MULTIVITAMIN GUMMIES MENS PO), multivitamin oral capsule take 1 capsule by oral route daily   Active, Disp: , Rfl:   •  vitamin C (ASCORBIC ACID) 250 MG tablet, Take 250 mg by mouth Daily., Disp: , Rfl:     OBJECTIVE  Vital Signs:   /85 (BP Location: Left arm)   Pulse 66   Ht 170.2 cm (67\")   Wt 88.1 kg (194 lb " "3.2 oz)   SpO2 98%   BMI 30.42 kg/m²    Estimated body mass index is 30.42 kg/m² as calculated from the following:    Height as of this encounter: 170.2 cm (67\").    Weight as of this encounter: 88.1 kg (194 lb 3.2 oz).     Wt Readings from Last 3 Encounters:   01/26/23 88.1 kg (194 lb 3.2 oz)   07/29/22 85.8 kg (189 lb 3.2 oz)   04/12/22 88.2 kg (194 lb 6 oz)     BP Readings from Last 3 Encounters:   01/26/23 122/85   07/29/22 123/82   04/12/22 115/83       Physical Exam  Vitals and nursing note reviewed.   Constitutional:       Appearance: Normal appearance.   HENT:      Head: Normocephalic and atraumatic.   Eyes:      Conjunctiva/sclera: Conjunctivae normal.      Pupils: Pupils are equal, round, and reactive to light.   Cardiovascular:      Rate and Rhythm: Normal rate and regular rhythm.      Pulses: Normal pulses.      Heart sounds: Normal heart sounds.   Pulmonary:      Effort: Pulmonary effort is normal.      Breath sounds: Normal breath sounds.   Abdominal:      General: Abdomen is flat.      Palpations: Abdomen is soft.   Musculoskeletal:         General: Normal range of motion.      Cervical back: Normal range of motion and neck supple.   Skin:     General: Skin is warm and dry.      Comments: There are several areas on feet that are hardened.  Patient is wanting referral to podiatry.   Neurological:      General: No focal deficit present.      Mental Status: He is alert and oriented to person, place, and time. Mental status is at baseline.   Psychiatric:         Mood and Affect: Mood normal.         Behavior: Behavior normal.         Thought Content: Thought content normal.         Judgment: Judgment normal.          Result Review    Common labs    Common Labs 3/4/22 3/4/22 3/4/22 3/4/22 6/8/22 8/10/22    0825 0825 0825 0825     Glucose    85     BUN    17     Creatinine    0.95     Sodium    140     Potassium    4.5     Chloride    104     Calcium    9.6     Albumin    4.70     Total Bilirubin    0.5   "   Alkaline Phosphatase    103     AST (SGOT)    29     ALT (SGPT)    41     WBC 5.86        Hemoglobin 15.9        Hematocrit 48.1        Platelets 241        Total Cholesterol   224 (A)  139    Triglycerides   165 (A)  129    HDL Cholesterol   45  45    LDL Cholesterol    149 (A)  71    PSA      1.060   Uric Acid  6.2       (A) Abnormal value            CMP    CMP 3/4/22   Glucose 85   BUN 17   Creatinine 0.95   eGFR 96.9   Sodium 140   Potassium 4.5   Chloride 104   Calcium 9.6   Total Protein 7.4   Albumin 4.70   Globulin 2.7   Total Bilirubin 0.5   Alkaline Phosphatase 103   AST (SGOT) 29   ALT (SGPT) 41   Albumin/Globulin Ratio 1.7   BUN/Creatinine Ratio 17.9   Anion Gap 11.0      Comments are available for some flowsheets but are not being displayed.           CBC    CBC 3/4/22   WBC 5.86   RBC 5.38   Hemoglobin 15.9   Hematocrit 48.1   MCV 89.4   MCH 29.6   MCHC 33.1   RDW 12.1 (A)   Platelets 241   (A) Abnormal value            CBC w/diff    CBC w/Diff 3/4/22   WBC 5.86   RBC 5.38   Hemoglobin 15.9   Hematocrit 48.1   MCV 89.4   MCH 29.6   MCHC 33.1   RDW 12.1 (A)   Platelets 241   Neutrophil Rel % 49.9   Immature Granulocyte Rel % 0.2   Lymphocyte Rel % 36.2   Monocyte Rel % 9.2   Eosinophil Rel % 3.6   Basophil Rel % 0.9   (A) Abnormal value            Lipid Panel    Lipid Panel 3/4/22 6/8/22   Total Cholesterol 224 (A) 139   Triglycerides 165 (A) 129   HDL Cholesterol 45 45   VLDL Cholesterol 30 23   LDL Cholesterol  149 (A) 71   LDL/HDL Ratio 3.24 1.52   (A) Abnormal value            TSH    TSH 3/4/22   TSH 2.660           Electrolytes    Electrolytes 3/4/22   Sodium 140   Potassium 4.5   Chloride 104   Calcium 9.6           Renal Profile    Renal Profile 3/4/22   BUN 17   Creatinine 0.95           BMP    BMP 3/4/22   BUN 17   Creatinine 0.95   Sodium 140   Potassium 4.5   Chloride 104   CO2 25.0   Calcium 9.6                       PSA    PSA 8/10/22   PSA 1.060             No Images in the past 120 days  found..      The above data has been reviewed by GUY Baca 01/26/2023 11:31 EST.        Patient Care Team:  Em Marie APRN as PCP - General (Emergency Medicine)        ASSESSMENT & PLAN    Diagnoses and all orders for this visit:    1. Corn of foot (Primary)  -     Ambulatory Referral to Podiatry    2. High cholesterol  -     atorvastatin (LIPITOR) 20 MG tablet; Take 1 tablet by mouth Daily.  Dispense: 90 tablet; Refill: 1  -     CBC w AUTO Differential; Future  -     Comprehensive metabolic panel; Future  -     Lipid panel; Future    3. Arthralgia, unspecified joint  -     diclofenac (VOLTAREN) 50 MG EC tablet; Take 1 tablet by mouth 2 (Two) Times a Day As Needed (pain).  Dispense: 60 tablet; Refill: 2    4. Screening for thyroid disorder  -     TSH+Free T4; Future         Tobacco Use: Medium Risk   • Smoking Tobacco Use: Former   • Smokeless Tobacco Use: Former   • Passive Exposure: Not on file       Follow Up     No follow-ups on file.      Patient was given instructions and counseling regarding his condition or for health maintenance advice. Please see specific information pulled into the AVS if appropriate.   I have reviewed information obtained and documented by others and I have confirmed the accuracy of this documented note.    GUY Baca

## 2023-02-16 ENCOUNTER — OFFICE VISIT (OUTPATIENT)
Dept: PODIATRY | Facility: CLINIC | Age: 53
End: 2023-02-16
Payer: OTHER GOVERNMENT

## 2023-02-16 VITALS
TEMPERATURE: 97.8 F | WEIGHT: 194 LBS | DIASTOLIC BLOOD PRESSURE: 84 MMHG | SYSTOLIC BLOOD PRESSURE: 116 MMHG | HEIGHT: 67 IN | HEART RATE: 62 BPM | OXYGEN SATURATION: 98 % | BODY MASS INDEX: 30.45 KG/M2

## 2023-02-16 DIAGNOSIS — M77.51 BURSITIS OF BOTH FEET: Primary | ICD-10-CM

## 2023-02-16 DIAGNOSIS — M77.52 BURSITIS OF BOTH FEET: Primary | ICD-10-CM

## 2023-02-16 DIAGNOSIS — L84 CALLUS: ICD-10-CM

## 2023-02-16 PROCEDURE — 99203 OFFICE O/P NEW LOW 30 MIN: CPT | Performed by: PODIATRIST

## 2023-02-16 NOTE — PROGRESS NOTES
[facility] - PODIATRY    Today's Date: 23    Patient Name: Mike Patel  MRN: [id number]  CSN: [id number]  PCP: Em Marie APRN, Last PCP Visit: 2023  Referring Provider: Em Marie APRN    SUBJECTIVE     Chief Complaint   Patient presents with   • Left Foot - Establish Care, Callouses   • Right Foot - Establish Care, Callouses     HPI: Mike Patel, a 52 y.o.male, presents to clinic.    Is a 52-year-old male presenting with bilateral foot pain patient states it hurts right behind his fifth toe on both feet.  Patient is on his feet steel toed boots.  Patient states that sometimes he digs out calluses in this area.  He is here for further treatment..    Past Medical History:   Diagnosis Date   • Arthritis    • Callus    • Cancer (HCC)     Basal   • Chest pain    • Seasonal allergies    • Shoulder joint derangement      Past Surgical History:   Procedure Laterality Date   • HERNIA REPAIR       Family History   Problem Relation Age of Onset   • Hodgkin's lymphoma Maternal Grandmother    • COPD Mother    • Rashes / Skin problems Mother    • COPD Maternal Grandfather      Social History     Socioeconomic History   • Marital status:    Tobacco Use   • Smoking status: Former     Packs/day: 2.00     Years: 1.00     Pack years: 2.00     Types: Cigarettes     Quit date: 2010     Years since quittin.8   • Smokeless tobacco: Former     Types: Chew     Quit date:    • Tobacco comments:     started at age 16 and stopped 20's 1 cig per week   Vaping Use   • Vaping Use: Never used   Substance and Sexual Activity   • Alcohol use: Yes     Alcohol/week: 2.0 standard drinks     Types: 2 Cans of beer per week     Comment: on the weekends, occasionally drinks 1 drink per day, has been drinking 21-30 years   • Drug use: Never   • Sexual activity: Yes     Partners: Female     Birth control/protection: Vasectomy     Allergies   Allergen Reactions   • Amoxicillin Hives      Current Outpatient Medications   Medication Sig Dispense Refill   • atorvastatin (LIPITOR) 20 MG tablet Take 1 tablet by mouth Daily. 90 tablet 1   • diclofenac (VOLTAREN) 50 MG EC tablet Take 1 tablet by mouth 2 (Two) Times a Day As Needed (pain). 60 tablet 2   • loratadine (CLARITIN) 10 MG tablet loratadine 10 mg oral tablet take 1 tablet (10 mg) by oral route once daily   Active     • Multiple Vitamins-Minerals (MULTIVITAMIN GUMMIES MENS PO) multivitamin oral capsule take 1 capsule by oral route daily   Active     • vitamin C (ASCORBIC ACID) 250 MG tablet Take 250 mg by mouth Daily.       No current facility-administered medications for this visit.     Review of Systems   Musculoskeletal:        Pain to feet bilateral   All other systems reviewed and are negative.      OBJECTIVE     Vitals:    02/16/23 0724   BP: 116/84   Pulse: 62   Temp: 97.8 °F (36.6 °C)   SpO2: 98%       WBC   Date Value Ref Range Status   03/04/2022 5.86 3.40 - 10.80 10*3/mm3 Final     RBC   Date Value Ref Range Status   03/04/2022 5.38 4.14 - 5.80 10*6/mm3 Final     Hemoglobin   Date Value Ref Range Status   03/04/2022 15.9 13.0 - 17.7 g/dL Final     Hematocrit   Date Value Ref Range Status   03/04/2022 48.1 37.5 - 51.0 % Final     MCV   Date Value Ref Range Status   03/04/2022 89.4 79.0 - 97.0 fL Final     MCH   Date Value Ref Range Status   03/04/2022 29.6 26.6 - 33.0 pg Final     MCHC   Date Value Ref Range Status   03/04/2022 33.1 31.5 - 35.7 g/dL Final     RDW   Date Value Ref Range Status   03/04/2022 12.1 (L) 12.3 - 15.4 % Final     RDW-SD   Date Value Ref Range Status   03/04/2022 40.6 37.0 - 54.0 fl Final     MPV   Date Value Ref Range Status   03/04/2022 11.0 6.0 - 12.0 fL Final     Platelets   Date Value Ref Range Status   03/04/2022 241 140 - 450 10*3/mm3 Final     Neutrophil %   Date Value Ref Range Status   03/04/2022 49.9 42.7 - 76.0 % Final     Lymphocyte %   Date Value Ref Range Status   03/04/2022 36.2 19.6 - 45.3 %  Final     Monocyte %   Date Value Ref Range Status   03/04/2022 9.2 5.0 - 12.0 % Final     Eosinophil %   Date Value Ref Range Status   03/04/2022 3.6 0.3 - 6.2 % Final     Basophil %   Date Value Ref Range Status   03/04/2022 0.9 0.0 - 1.5 % Final     Immature Grans %   Date Value Ref Range Status   03/04/2022 0.2 0.0 - 0.5 % Final     Neutrophils, Absolute   Date Value Ref Range Status   03/04/2022 2.93 1.70 - 7.00 10*3/mm3 Final     Lymphocytes, Absolute   Date Value Ref Range Status   03/04/2022 2.12 0.70 - 3.10 10*3/mm3 Final     Monocytes, Absolute   Date Value Ref Range Status   03/04/2022 0.54 0.10 - 0.90 10*3/mm3 Final     Eosinophils, Absolute   Date Value Ref Range Status   03/04/2022 0.21 0.00 - 0.40 10*3/mm3 Final     Basophils, Absolute   Date Value Ref Range Status   03/04/2022 0.05 0.00 - 0.20 10*3/mm3 Final     Immature Grans, Absolute   Date Value Ref Range Status   03/04/2022 0.01 0.00 - 0.05 10*3/mm3 Final     nRBC   Date Value Ref Range Status   03/04/2022 0.0 0.0 - 0.2 /100 WBC Final         Lab Results   Component Value Date    GLUCOSE 85 03/04/2022    BUN 17 03/04/2022    CREATININE 0.95 03/04/2022    EGFRIFNONA 93 06/17/2021    BCR 17.9 03/04/2022    K 4.5 03/04/2022    CO2 25.0 03/04/2022    CALCIUM 9.6 03/04/2022    ALBUMIN 4.70 03/04/2022    LABIL2 1.7 10/16/2019    AST 29 03/04/2022    ALT 41 03/04/2022       Patient seen in no apparent distress.      PHYSICAL EXAM:     Foot/Ankle Exam:       General:   Appearance: appears stated age and healthy    Orientation: AAOx3    Affect: appropriate    Gait: unimpaired    Shoe Gear:  Casual shoes    VASCULAR      Right Foot Vascularity   Normal vascular exam    Dorsalis pedis:  2+  Posterior tibial:  2+  Skin Temperature: warm    Edema Grading:  None  CFT:  < 3 seconds  Pedal Hair Growth:  Present  Varicosities: none       Left Foot Vascularity   Normal vascular exam    Dorsalis pedis:  2+  Posterior tibial:  2+  Skin Temperature: warm    Edema  Grading:  None  CFT:  < 3 seconds  Pedal Hair Growth:  Present  Varicosities: none        NEUROLOGIC     Right Foot Neurologic   Normal sensation    Light touch sensation:  Normal  Vibratory sensation:  Normal  Hot/Cold sensation: normal    Protective Sensation using [place] Monofilament:  10     Left Foot Neurologic   Normal sensation    Light touch sensation:  Normal  Vibratory sensation:  Normal  Hot/cold sensation: normal    Protective Sensation using [place] Monofilament:  10     MUSCULOSKELETAL      Right Foot Musculoskeletal   Tenderness comment:  To fifth MPJ with dry callus skin     Left Foot Musculoskeletal   Tenderness comment:  To fifth MPJ, with dry callus skin     MUSCLE STRENGTH     Right Foot Muscle Strength   Foot dorsiflexion:  4  Foot plantar flexion:  4  Foot inversion:  4  Foot eversion:  4     Left Foot Muscle Strength   Foot dorsiflexion:  4  Foot plantar flexion:  4  Foot inversion:  4  Foot eversion:  4     RANGE OF MOTION      Right Foot Range of Motion   Foot and ankle ROM within normal limits       Left Foot Range of Motion   Foot and ankle ROM within normal limits       DERMATOLOGIC     Right Foot Dermatologic   Skin: skin intact    Nails: normal       Left Foot Dermatologic   Skin: skin intact    Nails: normal        No results found.    ASSESSMENT/PLAN     Diagnoses and all orders for this visit:    1. Bursitis of both feet (Primary)    2. Callus      Lotion daily to feet  Discussed offloading fifth MPJs bilaterally when in work boots.  Discussed insert modification in order to do this.  Comprehensive lower extremity examination and evaluation was performed.    Discussed findings and treatment plan including risks, benefits, and treatment options with patient in detail. Patient agreed with treatment plan.    Medications and allergies reviewed.  Reviewed available lab values along with other pertinent labs.  These were discussed with the patient.    An After Visit  Summary was printed and given to the patient at discharge, including (if requested) any available informative/educational handouts regarding diagnosis, treatment, or medications. All questions were answered to patient/family satisfaction. Should symptoms fail to improve or worsen they agree to call or return to clinic or to go to the Emergency Department. Discussed the importance of following up with any needed screening tests/labs/specialist appointments and any requested follow-up recommended by me today. Importance of maintaining follow-up discussed and patient accepts that missed appointments can delay diagnosis and potentially lead to worsening of conditions.    Return if symptoms worsen or fail to improve., or sooner if acute issues arise.    This document has been electronically signed by Paul Walden DPM on February 16, 2023 08:27 EST

## 2023-03-27 DIAGNOSIS — E78.00 HIGH CHOLESTEROL: ICD-10-CM

## 2023-03-28 RX ORDER — ATORVASTATIN CALCIUM 20 MG/1
20 TABLET, FILM COATED ORAL DAILY
Qty: 90 TABLET | Refills: 1 | OUTPATIENT
Start: 2023-03-28

## 2023-05-18 ENCOUNTER — HOSPITAL ENCOUNTER (OUTPATIENT)
Dept: GENERAL RADIOLOGY | Facility: HOSPITAL | Age: 53
Discharge: HOME | End: 2023-05-18
Payer: OTHER GOVERNMENT

## 2023-05-18 ENCOUNTER — OFFICE VISIT (OUTPATIENT)
Dept: FAMILY MEDICINE CLINIC | Facility: CLINIC | Age: 53
End: 2023-05-18
Payer: OTHER GOVERNMENT

## 2023-05-18 VITALS
DIASTOLIC BLOOD PRESSURE: 80 MMHG | HEART RATE: 78 BPM | OXYGEN SATURATION: 96 % | HEIGHT: 67 IN | WEIGHT: 191.4 LBS | BODY MASS INDEX: 30.04 KG/M2 | SYSTOLIC BLOOD PRESSURE: 125 MMHG

## 2023-05-18 DIAGNOSIS — M25.561 ACUTE PAIN OF RIGHT KNEE: ICD-10-CM

## 2023-05-18 DIAGNOSIS — M25.561 ACUTE PAIN OF RIGHT KNEE: Primary | ICD-10-CM

## 2023-05-18 PROCEDURE — 73562 X-RAY EXAM OF KNEE 3: CPT

## 2023-05-18 RX ORDER — METHYLPREDNISOLONE ACETATE 40 MG/ML
40 INJECTION, SUSPENSION INTRA-ARTICULAR; INTRALESIONAL; INTRAMUSCULAR; SOFT TISSUE ONCE
Status: COMPLETED | OUTPATIENT
Start: 2023-05-18 | End: 2023-05-18

## 2023-05-18 RX ADMIN — METHYLPREDNISOLONE ACETATE 40 MG: 40 INJECTION, SUSPENSION INTRA-ARTICULAR; INTRALESIONAL; INTRAMUSCULAR; SOFT TISSUE at 09:39

## 2023-05-18 NOTE — PROGRESS NOTES
"Chief Complaint  Knee Pain (Rt knee pain for 2 weeks )    SUBJECTIVE  Mike Patel presents to [facility]    History of Present Illness  53-year-old Mike Patel presents today with complaints of right-sided knee pain that has been persistent for about 2 weeks.  Patient states that he has been taking his diclofenac 50 mg tablets and that it is not helping for the pain.  Patient states that he has been using compression knee brace that sometimes helps with some discomfort.  States that he walks a lot for work and does not get the rest that he needs for his knee.  States that going up steps and sleeping in bed are very painful for him.    Past Medical History:   Diagnosis Date   • Arthritis    • Callus    • Cancer     Basal   • Chest pain    • Seasonal allergies    • Shoulder joint derangement 2019      Family History   Problem Relation Age of Onset   • Hodgkin's lymphoma Maternal Grandmother    • COPD Mother    • Rashes / Skin problems Mother    • COPD Maternal Grandfather       Past Surgical History:   Procedure Laterality Date   • HERNIA REPAIR  1976        Current Outpatient Medications:   •  atorvastatin (LIPITOR) 20 MG tablet, Take 1 tablet by mouth Daily., Disp: 90 tablet, Rfl: 1  •  diclofenac (VOLTAREN) 50 MG EC tablet, Take 1 tablet by mouth 2 (Two) Times a Day As Needed (pain)., Disp: 60 tablet, Rfl: 2  •  loratadine (CLARITIN) 10 MG tablet, loratadine 10 mg oral tablet take 1 tablet (10 mg) by oral route once daily   Active, Disp: , Rfl:   •  Multiple Vitamins-Minerals (MULTIVITAMIN GUMMIES MENS PO), multivitamin oral capsule take 1 capsule by oral route daily   Active, Disp: , Rfl:   •  vitamin C (ASCORBIC ACID) 250 MG tablet, Take 1 tablet by mouth Daily., Disp: , Rfl:     OBJECTIVE  Vital Signs:   /80 (BP Location: Left arm)   Pulse 78   Ht 170.2 cm (67\")   Wt 86.8 kg (191 lb 6.4 oz)   SpO2 96%   BMI 29.98 kg/m²    Estimated body mass index is 29.98 kg/m² as " "calculated from the following:    Height as of this encounter: 170.2 cm (67\").    Weight as of this encounter: 86.8 kg (191 lb 6.4 oz).     Wt Readings from Last 3 Encounters:   05/18/23 86.8 kg (191 lb 6.4 oz)   02/16/23 88 kg (194 lb)   01/26/23 88.1 kg (194 lb 3.2 oz)     BP Readings from Last 3 Encounters:   05/18/23 125/80   02/16/23 116/84   01/26/23 122/85       Physical Exam     Result Review    Common labs        6/8/2022    07:30 8/10/2022    11:32   Common Labs   Total Cholesterol 139      Triglycerides 129      HDL Cholesterol 45      LDL Cholesterol  71      PSA  1.060                 Lipid Panel        6/8/2022    07:30   Lipid Panel   Total Cholesterol 139     Triglycerides 129     HDL Cholesterol 45     VLDL Cholesterol 23     LDL Cholesterol  71     LDL/HDL Ratio 1.52                               Lab Results   Component Value Date    CIBI78SW 38.4 06/17/2021           Lab Results   Component Value Date    URSSMJIX08 820 06/17/2021     No results found for: RBCUA, WBCUA, BACTERIA, SQUAMEPIUA, HYALCASTU, METHODOLOGY, COLORU, CLARITYU, PHUR, SPECGRAVUR, GLUCOSEU, KETONESU, BILIRUBINUR, BLOODU, PROTEINUA, LEUKOCYTESUR, NITRITEU, UROBILINOGEN    No Images in the past 120 days found..      The above data has been reviewed by GUY Baca 05/18/2023 09:27 EDT.          Patient Care Team:  Em Marie APRN as PCP - General (Emergency Medicine)           ASSESSMENT & PLAN    Diagnoses and all orders for this visit:    1. Acute pain of right knee (Primary)  Comments:  Refer to ortho for possible knee injections, patient will have physical therapy and have x-ray done.  Continue wearing the brace.   Orders:  -     XR Knee 3 View Right; Future  -     Ambulatory Referral to Orthopedic Surgery  -     Ambulatory Referral to Physical Therapy Evaluate and treat         Tobacco Use: Medium Risk   • Smoking Tobacco Use: Former   • Smokeless Tobacco Use: Former   • Passive Exposure: Not on file "       Follow Up     No follow-ups on file.      Patient was given instructions and counseling regarding his condition or for health maintenance advice. Please see specific information pulled into the AVS if appropriate.   I have reviewed information obtained and documented by others and I have confirmed the accuracy of this documented note.    GUY Baca

## 2023-05-18 NOTE — PROGRESS NOTES
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  I have already referred him to Ortho to discuss.

## 2023-06-01 ENCOUNTER — TELEPHONE (OUTPATIENT)
Dept: FAMILY MEDICINE CLINIC | Facility: CLINIC | Age: 53
End: 2023-06-01

## 2023-06-01 NOTE — TELEPHONE ENCOUNTER
Caller: Mike Patel    Relationship: Self    Best call back number: [phone]    Who is your current provider: TERRY GOMEZ     Who would you like your new provider to be: MANSI MARTÍNEZ     What are your reasons for transferring care: INSURANCE ISSUES

## 2023-07-18 PROBLEM — E78.5 HYPERLIPIDEMIA: Status: ACTIVE | Noted: 2023-07-18

## 2023-07-18 PROBLEM — M25.50 ARTHRALGIA: Status: ACTIVE | Noted: 2023-07-18

## 2023-08-14 ENCOUNTER — HOSPITAL ENCOUNTER (OUTPATIENT)
Dept: MRI IMAGING | Facility: HOSPITAL | Age: 53
Discharge: HOME | End: 2023-08-14
Admitting: STUDENT IN AN ORGANIZED HEALTH CARE EDUCATION/TRAINING PROGRAM
Payer: OTHER GOVERNMENT

## 2023-08-14 DIAGNOSIS — S83.241A ACUTE MEDIAL MENISCUS TEAR OF RIGHT KNEE, INITIAL ENCOUNTER: ICD-10-CM

## 2023-08-14 PROCEDURE — 73721 MRI JNT OF LWR EXTRE W/O DYE: CPT

## 2023-08-17 ENCOUNTER — TELEPHONE (OUTPATIENT)
Dept: ORTHOPEDIC SURGERY | Facility: CLINIC | Age: 53
End: 2023-08-17
Payer: OTHER GOVERNMENT

## 2023-08-17 NOTE — TELEPHONE ENCOUNTER
----- Message from Xiomara Rhoades sent at 8/17/2023  1:46 PM EDT -----  Regarding: MRI RESULTS  PLEASE SCHEDULE PATIENT FOR MRI FOLLOW UP WITH DR PORTILLO

## 2023-08-23 ENCOUNTER — OFFICE VISIT (OUTPATIENT)
Dept: ORTHOPEDIC SURGERY | Facility: CLINIC | Age: 53
End: 2023-08-23
Payer: OTHER GOVERNMENT

## 2023-08-23 ENCOUNTER — PREP FOR SURGERY (OUTPATIENT)
Dept: OTHER | Facility: HOSPITAL | Age: 53
End: 2023-08-23
Payer: OTHER GOVERNMENT

## 2023-08-23 VITALS
SYSTOLIC BLOOD PRESSURE: 121 MMHG | WEIGHT: 189 LBS | OXYGEN SATURATION: 97 % | DIASTOLIC BLOOD PRESSURE: 82 MMHG | HEIGHT: 67 IN | HEART RATE: 74 BPM | BODY MASS INDEX: 29.66 KG/M2

## 2023-08-23 DIAGNOSIS — S83.241A ACUTE MEDIAL MENISCUS TEAR OF RIGHT KNEE, INITIAL ENCOUNTER: Primary | ICD-10-CM

## 2023-08-23 DIAGNOSIS — M94.261 CHONDROMALACIA OF RIGHT KNEE: ICD-10-CM

## 2023-08-23 RX ORDER — CEFAZOLIN SODIUM 2 G/100ML
2 INJECTION, SOLUTION INTRAVENOUS ONCE
OUTPATIENT
Start: 2023-08-23 | End: 2023-08-23

## 2023-08-23 RX ORDER — CEFAZOLIN SODIUM IN 0.9 % NACL 3 G/100 ML
3 INTRAVENOUS SOLUTION, PIGGYBACK (ML) INTRAVENOUS ONCE
OUTPATIENT
Start: 2023-08-23 | End: 2023-08-23

## 2023-08-23 NOTE — PROGRESS NOTES
"Chief Complaint  Follow-up and Pain of the Right Knee    Subjective          Mike Patel presents to [facility] for   History of Present Illness    The patient presents here today for follow up evaluation of the right knee. The patient recently had an MRI and is here today for those results. To review, The patient reports right knee pain. He denies injury or trauma. He reports pain with side to side movement. He has tried bracing without relief. He denies previous surgery.      Allergies   Allergen Reactions    Amoxicillin Hives        Social History     Socioeconomic History    Marital status:    Tobacco Use    Smoking status: Former     Packs/day: 2.00     Years: 1.00     Pack years: 2.00     Types: Cigarettes     Quit date: 2010     Years since quittin.3    Smokeless tobacco: Former     Types: Chew     Quit date:     Tobacco comments:     started at age 16 and stopped 20's 1 cig per week   Vaping Use    Vaping Use: Never used   Substance and Sexual Activity    Alcohol use: Yes     Alcohol/week: 2.0 standard drinks     Types: 2 Cans of beer per week     Comment: on the weekends, occasionally drinks 1 drink per day, has been drinking 21-30 years    Drug use: Never    Sexual activity: Yes     Partners: Female     Birth control/protection: Vasectomy        I reviewed the patient's chief complaint, history of present illness, review of systems, past medical history, surgical history, family history, social history, medications, and allergy list.     REVIEW OF SYSTEMS    Constitutional: Denies fevers, chills, weight loss  Cardiovascular: Denies chest pain, shortness of breath  Skin: Denies rashes, acute skin changes  Neurologic: Denies headache, loss of consciousness  MSK: Right knee pain      Objective   Vital Signs:   /82   Pulse 74   Ht 170.2 cm (67\")   Wt 85.7 kg (189 lb)   SpO2 97%   BMI 29.60 kg/mý     Body mass index is 29.6 kg/mý.    Physical " Exam    General: Alert. No acute distress.   Right knee- ROM 0-120 degrees. Stable to varus/valgus stress. Stable to anterior/posterior drawer. Positive EHL, FHL, GS and TA. Sensation intact to all 5 nerves of the foot. Positive pulses. Medial joint line tenderness. Positive Cliff's.      Procedures    Imaging Results (Most Recent)       None                     Assessment and Plan        MRI Knee Right Without Contrast    Result Date: 8/15/2023  Narrative: PROCEDURE: MRI KNEE RIGHT  WO CONTRAST  COMPARISON: [facility], CR, XR KNEE 3 VW RIGHT, 5/18/2023, 10:03.  INDICATIONS: RIGHT KNEE PAIN X2 MONTHS. NO KNOWN INJURY.      TECHNIQUE: A complete multi-planar MRI was performed.   FINDINGS:  No fracture or malalignment is identified.  Marrow signal appears normal.   There is a horizontal undersurface tear of the medial meniscal posterior horn.  No tear of the lateral meniscus is seen.  The cruciate ligaments, medial collateral ligament, lateral collateral ligament complex, patellar retinacula and extensor mechanism appear unremarkable.  No significant joint effusion is seen.  Cartilage in the joint is intact.  No loose body is seen.  A 1.2 cm x 0.4 cm popliteal cyst is noted.      Impression:   1. Horizontal undersurface tear of the medial meniscal posterior horn     Rylan Venegas M.D.       Electronically Signed and Approved By: Rylan Venegas M.D. on 8/15/2023 at 12:15               Diagnoses and all orders for this visit:    1. Acute medial meniscus tear of right knee, initial encounter (Primary)    2. Chondromalacia of right knee        Discussed the treatment options with the patient, operative vs non-operative. I reviewed the MRI with the patient. Discussed the risks and benefits of a right knee arthroscopic partial medial menisectomy and chondroplasty. The patient expressed understanding and wished to proceed.       Discussed surgery., Risks/benefits discussed with patient including, but  not limited to: infection, bleeding, neurovascular damage, re-rupture, aesthetic deformity, need for further surgery, and death., Surgery pamphlet given., Call or return if worsening symptoms., DME order for a 3 in 1 given today due to patient will be confined to one room/level of the home that does not offer a toilet during postop recovery. , I am requesting authorization for the samr System to reduce the patient's pain and aid in their recovery. I believe the samr Sport System will have positive impact on my patient's quality of life. I would appreciate prompt review of the information and authorization of the samr System.  samr Sport utilizes ultrasonic waves that penetrate 5 cm into the tissue, which increases circulation, oxygen and nutrient delivery, and the removal of waste products, such as lactic acid, from the site of the musculoskeletal injury.  The samr System is a new FDA approved (FDA 510K 671899) treatment modality that that has been shown in recent clinical trials sponsored by the NIH (National Institutes of Health), the DOD (Department of Defense) and [facility] the research arm of the NASA ([employer]ics and [employer]) to reduce patient's pain, increase mobility and speed recovery. , and I am ordering the use of the Nice1 cold therapy machine for 60 days post-op as part of an opioid-sparing approach to help manage pain and edema.  I feel this is medically necessary for the best care for this patient.       Scribed for Edin Brito MD by Bianca Loredo  08/23/2023   07:59 EDT         Follow Up       2 weeks postoperatively.      Patient was given instructions and counseling regarding his condition or for health maintenance advice. Please see specific information pulled into the AVS if appropriate.       I have personally performed the services described in this document as scribed by the above individual and it is both accurate and complete.     Edin Brito MD  08/23/23  08:04  EDT

## 2023-09-11 ENCOUNTER — TELEPHONE (OUTPATIENT)
Dept: ORTHOPEDIC SURGERY | Facility: CLINIC | Age: 53
End: 2023-09-11
Payer: OTHER GOVERNMENT

## 2023-09-11 NOTE — TELEPHONE ENCOUNTER
PATIENT CALLED IN STATING HE WISHES TO CANCEL SX FOR 9/19/2023 AND DID NOT WANT TO RESCHEDULE AT THIS TIME. NO REASON GIVEN.     SX FOR 9/19/2023 CX'D WITH [facility] IN SURGERY SCHEDULING; PER PATIENT'S REQUEST.

## 2023-09-25 DIAGNOSIS — E78.5 HYPERLIPIDEMIA, UNSPECIFIED HYPERLIPIDEMIA TYPE: ICD-10-CM

## 2023-09-25 RX ORDER — ATORVASTATIN CALCIUM 20 MG/1
20 TABLET, FILM COATED ORAL DAILY
Qty: 90 TABLET | Refills: 1 | OUTPATIENT
Start: 2023-09-25

## 2023-10-16 DIAGNOSIS — M25.561 CHRONIC PAIN OF RIGHT KNEE: ICD-10-CM

## 2023-10-16 DIAGNOSIS — G89.29 CHRONIC PAIN OF RIGHT KNEE: ICD-10-CM

## 2024-01-16 DIAGNOSIS — G89.29 CHRONIC PAIN OF RIGHT KNEE: ICD-10-CM

## 2024-01-16 DIAGNOSIS — M25.561 CHRONIC PAIN OF RIGHT KNEE: ICD-10-CM

## 2024-01-17 ENCOUNTER — OFFICE VISIT (OUTPATIENT)
Dept: FAMILY MEDICINE CLINIC | Facility: CLINIC | Age: 54
End: 2024-01-17
Payer: OTHER GOVERNMENT

## 2024-01-17 ENCOUNTER — LAB (OUTPATIENT)
Dept: LAB | Facility: HOSPITAL | Age: 54
End: 2024-01-17
Payer: OTHER GOVERNMENT

## 2024-01-17 VITALS
HEART RATE: 69 BPM | WEIGHT: 194 LBS | DIASTOLIC BLOOD PRESSURE: 87 MMHG | HEIGHT: 67 IN | OXYGEN SATURATION: 97 % | BODY MASS INDEX: 30.45 KG/M2 | SYSTOLIC BLOOD PRESSURE: 121 MMHG

## 2024-01-17 DIAGNOSIS — E78.5 HYPERLIPIDEMIA, UNSPECIFIED HYPERLIPIDEMIA TYPE: ICD-10-CM

## 2024-01-17 DIAGNOSIS — Z13.29 THYROID DISORDER SCREEN: ICD-10-CM

## 2024-01-17 DIAGNOSIS — Z00.00 ANNUAL PHYSICAL EXAM: ICD-10-CM

## 2024-01-17 DIAGNOSIS — E66.9 CLASS 1 OBESITY WITH SERIOUS COMORBIDITY AND BODY MASS INDEX (BMI) OF 30.0 TO 30.9 IN ADULT, UNSPECIFIED OBESITY TYPE: ICD-10-CM

## 2024-01-17 DIAGNOSIS — Z00.00 ANNUAL PHYSICAL EXAM: Primary | ICD-10-CM

## 2024-01-17 PROBLEM — E66.811 CLASS 1 OBESITY WITH SERIOUS COMORBIDITY AND BODY MASS INDEX (BMI) OF 30.0 TO 30.9 IN ADULT: Status: ACTIVE | Noted: 2024-01-17

## 2024-01-17 LAB
ALBUMIN SERPL-MCNC: 4.4 G/DL (ref 3.5–5.2)
ALBUMIN/GLOB SERPL: 1.8 G/DL
ALP SERPL-CCNC: 101 U/L (ref 39–117)
ALT SERPL W P-5'-P-CCNC: 49 U/L (ref 1–41)
ANION GAP SERPL CALCULATED.3IONS-SCNC: 9.8 MMOL/L (ref 5–15)
AST SERPL-CCNC: 27 U/L (ref 1–40)
BASOPHILS # BLD AUTO: 0.06 10*3/MM3 (ref 0–0.2)
BASOPHILS NFR BLD AUTO: 0.9 % (ref 0–1.5)
BILIRUB SERPL-MCNC: 0.8 MG/DL (ref 0–1.2)
BUN SERPL-MCNC: 20 MG/DL (ref 6–20)
BUN/CREAT SERPL: 21.1 (ref 7–25)
CALCIUM SPEC-SCNC: 9.6 MG/DL (ref 8.6–10.5)
CHLORIDE SERPL-SCNC: 103 MMOL/L (ref 98–107)
CHOLEST SERPL-MCNC: 135 MG/DL (ref 0–200)
CO2 SERPL-SCNC: 26.2 MMOL/L (ref 22–29)
CREAT SERPL-MCNC: 0.95 MG/DL (ref 0.76–1.27)
DEPRECATED RDW RBC AUTO: 38.3 FL (ref 37–54)
EGFRCR SERPLBLD CKD-EPI 2021: 95.7 ML/MIN/1.73
EOSINOPHIL # BLD AUTO: 0.17 10*3/MM3 (ref 0–0.4)
EOSINOPHIL NFR BLD AUTO: 2.7 % (ref 0.3–6.2)
ERYTHROCYTE [DISTWIDTH] IN BLOOD BY AUTOMATED COUNT: 12.1 % (ref 12.3–15.4)
GLOBULIN UR ELPH-MCNC: 2.4 GM/DL
GLUCOSE SERPL-MCNC: 90 MG/DL (ref 65–99)
HCT VFR BLD AUTO: 45.9 % (ref 37.5–51)
HDLC SERPL-MCNC: 40 MG/DL (ref 40–60)
HGB BLD-MCNC: 15.6 G/DL (ref 13–17.7)
IMM GRANULOCYTES # BLD AUTO: 0.02 10*3/MM3 (ref 0–0.05)
IMM GRANULOCYTES NFR BLD AUTO: 0.3 % (ref 0–0.5)
LDLC SERPL CALC-MCNC: 73 MG/DL (ref 0–100)
LDLC/HDLC SERPL: 1.75 {RATIO}
LYMPHOCYTES # BLD AUTO: 2.4 10*3/MM3 (ref 0.7–3.1)
LYMPHOCYTES NFR BLD AUTO: 37.4 % (ref 19.6–45.3)
MCH RBC QN AUTO: 29.5 PG (ref 26.6–33)
MCHC RBC AUTO-ENTMCNC: 34 G/DL (ref 31.5–35.7)
MCV RBC AUTO: 86.9 FL (ref 79–97)
MONOCYTES # BLD AUTO: 0.57 10*3/MM3 (ref 0.1–0.9)
MONOCYTES NFR BLD AUTO: 8.9 % (ref 5–12)
NEUTROPHILS NFR BLD AUTO: 3.19 10*3/MM3 (ref 1.7–7)
NEUTROPHILS NFR BLD AUTO: 49.8 % (ref 42.7–76)
NRBC BLD AUTO-RTO: 0 /100 WBC (ref 0–0.2)
PLATELET # BLD AUTO: 235 10*3/MM3 (ref 140–450)
PMV BLD AUTO: 12.1 FL (ref 6–12)
POTASSIUM SERPL-SCNC: 4.6 MMOL/L (ref 3.5–5.2)
PROT SERPL-MCNC: 6.8 G/DL (ref 6–8.5)
RBC # BLD AUTO: 5.28 10*6/MM3 (ref 4.14–5.8)
SODIUM SERPL-SCNC: 139 MMOL/L (ref 136–145)
TRIGL SERPL-MCNC: 125 MG/DL (ref 0–150)
TSH SERPL DL<=0.05 MIU/L-ACNC: 1.69 UIU/ML (ref 0.27–4.2)
VLDLC SERPL-MCNC: 22 MG/DL (ref 5–40)
WBC NRBC COR # BLD AUTO: 6.41 10*3/MM3 (ref 3.4–10.8)

## 2024-01-17 PROCEDURE — 99396 PREV VISIT EST AGE 40-64: CPT | Performed by: NURSE PRACTITIONER

## 2024-01-17 PROCEDURE — 80053 COMPREHEN METABOLIC PANEL: CPT

## 2024-01-17 PROCEDURE — 36415 COLL VENOUS BLD VENIPUNCTURE: CPT

## 2024-01-17 PROCEDURE — 84443 ASSAY THYROID STIM HORMONE: CPT

## 2024-01-17 PROCEDURE — 85025 COMPLETE CBC W/AUTO DIFF WBC: CPT

## 2024-01-17 PROCEDURE — 80061 LIPID PANEL: CPT

## 2024-01-17 RX ORDER — ATORVASTATIN CALCIUM 20 MG/1
20 TABLET, FILM COATED ORAL DAILY
Qty: 90 TABLET | Refills: 1 | Status: SHIPPED | OUTPATIENT
Start: 2024-01-17

## 2024-01-17 NOTE — ASSESSMENT & PLAN NOTE
Patient's (Body mass index is 30.38 kg/m².) indicates that they are obese (BMI >30) with health conditions that include dyslipidemias . Weight is worsening. BMI  is above average; BMI management plan is completed. We discussed portion control and increasing exercise.

## 2024-01-17 NOTE — PROGRESS NOTES
"Chief Complaint  Hyperlipidemia, Allergies, annual exam  SUBJECTIVE  Mike Patel presents to [facility] for 6 month follow up on Hyperlipidemia and annual exam. Pt reports no problems or concerns at this time. Pt is due for refill and labs.       History of Present Illness  Past Medical History:   Diagnosis Date    Allergic     Amoxicillin    Arthritis     Callus     Cancer     Basal    Chest pain     Frozen shoulder 01/01/2020    Left side    Kidney stone Prior to 2014    Happened one time while deployed    Knee swelling 4/1/2023    Right knee    Seasonal allergies     Shoulder joint derangement 2019      Family History   Problem Relation Age of Onset    Hodgkin's lymphoma Maternal Grandmother     COPD Mother     Rashes / Skin problems Mother     COPD Maternal Grandfather       Past Surgical History:   Procedure Laterality Date    HERNIA REPAIR  1976    VASECTOMY  2013        Current Outpatient Medications:     atorvastatin (LIPITOR) 20 MG tablet, Take 1 tablet by mouth Daily., Disp: 90 tablet, Rfl: 1    diclofenac (VOLTAREN) 50 MG EC tablet, TAKE 1 TABLET BY MOUTH TWICE DAILY AS NEEDED FOR PAIN, Disp: 60 tablet, Rfl: 2    loratadine (CLARITIN) 10 MG tablet, loratadine 10 mg oral tablet take 1 tablet (10 mg) by oral route once daily   Active, Disp: , Rfl:     Multiple Vitamins-Minerals (MULTIVITAMIN GUMMIES MENS PO), multivitamin oral capsule take 1 capsule by oral route daily   Active, Disp: , Rfl:     vitamin C (ASCORBIC ACID) 250 MG tablet, Take 1 tablet by mouth Daily., Disp: , Rfl:     OBJECTIVE  Vital Signs:   /87   Pulse 69   Ht 170.2 cm (67\")   Wt 88 kg (194 lb)   SpO2 97%   BMI 30.38 kg/m²    Estimated body mass index is 30.38 kg/m² as calculated from the following:    Height as of this encounter: 170.2 cm (67\").    Weight as of this encounter: 88 kg (194 lb).     Wt Readings from Last 3 Encounters:   01/17/24 88 kg (194 lb)   08/23/23 85.7 kg (189 lb) "   07/18/23 86.2 kg (190 lb)     BP Readings from Last 3 Encounters:   01/17/24 121/87   08/23/23 121/82   07/18/23 124/74       Physical Exam  Vitals reviewed.   Constitutional:       General: He is not in acute distress.     Appearance: Normal appearance. He is not diaphoretic.   HENT:      Head: Normocephalic and atraumatic. Hair is normal.      Right Ear: Hearing, tympanic membrane, ear canal and external ear normal.      Left Ear: Hearing, tympanic membrane, ear canal and external ear normal.      Nose: Nose normal. No nasal deformity.      Mouth/Throat:      Mouth: Mucous membranes are moist. No oral lesions.      Pharynx: Uvula midline. No uvula swelling.   Eyes:      General: Lids are normal. No scleral icterus.        Right eye: No discharge.         Left eye: No discharge.      Extraocular Movements: Extraocular movements intact.      Right eye: Normal extraocular motion and no nystagmus.      Left eye: Normal extraocular motion and no nystagmus.      Conjunctiva/sclera: Conjunctivae normal.      Pupils: Pupils are equal, round, and reactive to light.   Neck:      Thyroid: No thyromegaly.      Vascular: No JVD.   Cardiovascular:      Rate and Rhythm: Normal rate and regular rhythm.      Pulses: Normal pulses.      Heart sounds: Normal heart sounds. No murmur heard.     No gallop.   Pulmonary:      Effort: Pulmonary effort is normal. No respiratory distress.      Breath sounds: Normal breath sounds. No wheezing or rales.   Chest:      Chest wall: No tenderness.   Abdominal:      General: Bowel sounds are normal. There is no distension.      Palpations: Abdomen is soft. There is no mass.      Tenderness: There is no abdominal tenderness. There is no guarding.      Hernia: No hernia is present.   Musculoskeletal:         General: No tenderness or deformity. Normal range of motion.      Cervical back: Normal range of motion and neck supple.   Lymphadenopathy:      Cervical: No cervical adenopathy.   Skin:      General: Skin is warm and dry.      Findings: No rash.   Neurological:      Mental Status: He is alert and oriented to person, place, and time.      Cranial Nerves: No cranial nerve deficit.      Coordination: Coordination normal.      Deep Tendon Reflexes: Reflexes are normal and symmetric. Reflexes normal.   Psychiatric:         Mood and Affect: Mood normal.         Behavior: Behavior normal.         Thought Content: Thought content normal.         Judgment: Judgment normal.          Result Review    CMP          7/18/2023    08:28   CMP   Glucose 93    BUN 21    Creatinine 0.85    EGFR 103.9    Sodium 139    Potassium 3.9    Chloride 104    Calcium 9.7    Total Protein 6.5    Albumin 4.2    Globulin 2.3    Total Bilirubin 0.6    Alkaline Phosphatase 103    AST (SGOT) 31    ALT (SGPT) 39    Albumin/Globulin Ratio 1.8    BUN/Creatinine Ratio 24.7    Anion Gap 8.9      CBC          7/18/2023    08:28   CBC   WBC 7.34    RBC 4.97    Hemoglobin 14.9    Hematocrit 43.1    MCV 86.7    MCH 30.0    MCHC 34.6    RDW 12.3    Platelets 230      Lipid Panel          7/18/2023    08:28   Lipid Panel   Total Cholesterol 142    Triglycerides 148    HDL Cholesterol 41    VLDL Cholesterol 26    LDL Cholesterol  75    LDL/HDL Ratio 1.74      TSH          7/18/2023    08:28   TSH   TSH 1.790        No Images in the past 120 days found..     The above data has been reviewed by GUY Cox 01/17/2024 07:45 EST.          Patient Care Team:  Maddy Bella APRN as PCP - General (Nurse Practitioner)            ASSESSMENT & PLAN    Diagnoses and all orders for this visit:    1. Annual physical exam (Primary)  -     Comprehensive Metabolic Panel; Future  -     CBC & Differential; Future  -     Lipid Panel; Future  -     TSH Rfx On Abnormal To Free T4; Future    2. Hyperlipidemia, unspecified hyperlipidemia type  -     Lipid Panel; Future  -     atorvastatin (LIPITOR) 20 MG tablet; Take 1 tablet by mouth Daily.  Dispense:  90 tablet; Refill: 1    3. Thyroid disorder screen  -     TSH Rfx On Abnormal To Free T4; Future    4. Class 1 obesity with serious comorbidity and body mass index (BMI) of 30.0 to 30.9 in adult, unspecified obesity type  Assessment & Plan:  Patient's (Body mass index is 30.38 kg/m².) indicates that they are obese (BMI >30) with health conditions that include dyslipidemias . Weight is worsening. BMI  is above average; BMI management plan is completed. We discussed portion control and increasing exercise.            Tobacco Use: Medium Risk (1/17/2024)    Patient History     Smoking Tobacco Use: Former     Smokeless Tobacco Use: Former     Passive Exposure: Not on file     The patient is advised to begin progressive daily aerobic exercise program, follow a low fat, low cholesterol diet, attempt to lose weight, continue current medications, continue current healthy lifestyle patterns, and return for routine annual checkups.    Follow Up     Return in about 6 months (around 7/17/2024), or if symptoms worsen or fail to improve.        Patient was given instructions and counseling regarding his condition or for health maintenance advice. Please see specific information pulled into the AVS if appropriate.   I have reviewed information obtained and documented by others and I have confirmed the accuracy of this documented note.    GUY Cox

## 2024-01-18 DIAGNOSIS — R79.89 ELEVATED LFTS: Primary | ICD-10-CM

## 2024-02-08 ENCOUNTER — HOSPITAL ENCOUNTER (OUTPATIENT)
Dept: ULTRASOUND IMAGING | Facility: HOSPITAL | Age: 54
Discharge: HOME | End: 2024-02-08
Admitting: NURSE PRACTITIONER
Payer: OTHER GOVERNMENT

## 2024-02-08 DIAGNOSIS — R79.89 ELEVATED LFTS: Primary | ICD-10-CM

## 2024-02-08 DIAGNOSIS — R79.89 ELEVATED LFTS: ICD-10-CM

## 2024-02-08 DIAGNOSIS — R93.429 ABNORMAL RENAL ULTRASOUND: Primary | ICD-10-CM

## 2024-02-08 DIAGNOSIS — K76.89 LIVER CYST: ICD-10-CM

## 2024-02-08 PROCEDURE — 76705 ECHO EXAM OF ABDOMEN: CPT

## 2024-03-27 DIAGNOSIS — E78.5 HYPERLIPIDEMIA, UNSPECIFIED HYPERLIPIDEMIA TYPE: ICD-10-CM

## 2024-03-27 RX ORDER — ATORVASTATIN CALCIUM 20 MG/1
20 TABLET, FILM COATED ORAL DAILY
Qty: 90 TABLET | Refills: 1 | Status: SHIPPED | OUTPATIENT
Start: 2024-03-27

## 2024-03-29 ENCOUNTER — LAB (OUTPATIENT)
Dept: LAB | Facility: HOSPITAL | Age: 54
End: 2024-03-29
Payer: OTHER GOVERNMENT

## 2024-03-29 DIAGNOSIS — R79.89 ELEVATED LFTS: ICD-10-CM

## 2024-03-29 LAB
HBV SURFACE AB SER RIA-ACNC: NORMAL
HBV SURFACE AG SERPL QL IA: NORMAL
HCV AB SER DONR QL: NORMAL

## 2024-03-29 PROCEDURE — 87340 HEPATITIS B SURFACE AG IA: CPT

## 2024-03-29 PROCEDURE — 86704 HEP B CORE ANTIBODY TOTAL: CPT

## 2024-03-29 PROCEDURE — 86708 HEPATITIS A ANTIBODY: CPT

## 2024-03-29 PROCEDURE — 86706 HEP B SURFACE ANTIBODY: CPT

## 2024-03-29 PROCEDURE — 86803 HEPATITIS C AB TEST: CPT

## 2024-03-29 PROCEDURE — 36415 COLL VENOUS BLD VENIPUNCTURE: CPT

## 2024-03-30 LAB
HAV AB SER QL IA: POSITIVE
HBV CORE AB SERPL QL IA: NEGATIVE

## 2024-04-01 ENCOUNTER — LAB (OUTPATIENT)
Dept: LAB | Facility: HOSPITAL | Age: 54
End: 2024-04-01
Payer: OTHER GOVERNMENT

## 2024-04-01 DIAGNOSIS — R79.89 ELEVATED LFTS: ICD-10-CM

## 2024-04-01 DIAGNOSIS — R76.8 HEPATITIS A ANTIBODY POSITIVE: ICD-10-CM

## 2024-04-01 DIAGNOSIS — R76.8 HEPATITIS A ANTIBODY POSITIVE: Primary | ICD-10-CM

## 2024-04-01 LAB — HAV IGM SERPL QL IA: NORMAL

## 2024-04-01 PROCEDURE — 80076 HEPATIC FUNCTION PANEL: CPT

## 2024-04-01 PROCEDURE — 36415 COLL VENOUS BLD VENIPUNCTURE: CPT

## 2024-04-01 PROCEDURE — 86709 HEPATITIS A IGM ANTIBODY: CPT

## 2024-04-02 DIAGNOSIS — R79.89 ELEVATED LFTS: Primary | ICD-10-CM

## 2024-04-02 LAB
ALBUMIN SERPL-MCNC: 4.5 G/DL (ref 3.5–5.2)
ALP SERPL-CCNC: 116 U/L (ref 39–117)
ALT SERPL W P-5'-P-CCNC: 43 U/L (ref 1–41)
AST SERPL-CCNC: 28 U/L (ref 1–40)
BILIRUB CONJ SERPL-MCNC: <0.2 MG/DL (ref 0–0.3)
BILIRUB INDIRECT SERPL-MCNC: ABNORMAL MG/DL
BILIRUB SERPL-MCNC: 0.3 MG/DL (ref 0–1.2)
PROT SERPL-MCNC: 6.3 G/DL (ref 6–8.5)

## 2024-07-18 ENCOUNTER — LAB (OUTPATIENT)
Dept: LAB | Facility: HOSPITAL | Age: 54
End: 2024-07-18
Payer: OTHER GOVERNMENT

## 2024-07-18 ENCOUNTER — OFFICE VISIT (OUTPATIENT)
Dept: GASTROENTEROLOGY | Facility: CLINIC | Age: 54
End: 2024-07-18
Payer: OTHER GOVERNMENT

## 2024-07-18 VITALS
HEART RATE: 63 BPM | HEIGHT: 67 IN | DIASTOLIC BLOOD PRESSURE: 88 MMHG | BODY MASS INDEX: 30.29 KG/M2 | SYSTOLIC BLOOD PRESSURE: 118 MMHG | WEIGHT: 193 LBS

## 2024-07-18 DIAGNOSIS — R74.8 ELEVATED LIVER ENZYMES: Primary | ICD-10-CM

## 2024-07-18 DIAGNOSIS — Z12.11 ENCOUNTER FOR SCREENING FOR MALIGNANT NEOPLASM OF COLON: ICD-10-CM

## 2024-07-18 DIAGNOSIS — R74.8 ELEVATED LIVER ENZYMES: ICD-10-CM

## 2024-07-18 LAB
ALBUMIN SERPL-MCNC: 4.4 G/DL (ref 3.5–5.2)
ALP SERPL-CCNC: 112 U/L (ref 39–117)
ALPHA-FETOPROTEIN: 3.71 NG/ML (ref 0–8.3)
ALPHA1 GLOB MFR UR ELPH: 132 MG/DL (ref 90–200)
ALT SERPL W P-5'-P-CCNC: 40 U/L (ref 1–41)
AST SERPL-CCNC: 16 U/L (ref 1–40)
BILIRUB CONJ SERPL-MCNC: <0.2 MG/DL (ref 0–0.3)
BILIRUB INDIRECT SERPL-MCNC: NORMAL MG/DL
BILIRUB SERPL-MCNC: 0.4 MG/DL (ref 0–1.2)
CERULOPLASMIN SERPL-MCNC: 20 MG/DL (ref 16–31)
FERRITIN SERPL-MCNC: 171 NG/ML (ref 30–400)
INR PPP: 0.95 (ref 0.86–1.15)
IRON 24H UR-MRATE: 48 MCG/DL (ref 59–158)
IRON SATN MFR SERPL: 13 % (ref 20–50)
PROT SERPL-MCNC: 7.1 G/DL (ref 6–8.5)
PROTHROMBIN TIME: 12.9 SECONDS (ref 11.8–14.9)
TIBC SERPL-MCNC: 378 MCG/DL (ref 298–536)
TRANSFERRIN SERPL-MCNC: 254 MG/DL (ref 200–360)

## 2024-07-18 PROCEDURE — 84466 ASSAY OF TRANSFERRIN: CPT

## 2024-07-18 PROCEDURE — 83540 ASSAY OF IRON: CPT

## 2024-07-18 PROCEDURE — 36415 COLL VENOUS BLD VENIPUNCTURE: CPT

## 2024-07-18 PROCEDURE — 82525 ASSAY OF COPPER: CPT

## 2024-07-18 PROCEDURE — 85610 PROTHROMBIN TIME: CPT

## 2024-07-18 PROCEDURE — 86381 MITOCHONDRIAL ANTIBODY EACH: CPT

## 2024-07-18 PROCEDURE — 82105 ALPHA-FETOPROTEIN SERUM: CPT

## 2024-07-18 PROCEDURE — 86015 ACTIN ANTIBODY EACH: CPT

## 2024-07-18 PROCEDURE — 80076 HEPATIC FUNCTION PANEL: CPT

## 2024-07-18 PROCEDURE — 82728 ASSAY OF FERRITIN: CPT

## 2024-07-18 PROCEDURE — 82784 ASSAY IGA/IGD/IGG/IGM EACH: CPT

## 2024-07-18 PROCEDURE — 86038 ANTINUCLEAR ANTIBODIES: CPT

## 2024-07-18 PROCEDURE — 86334 IMMUNOFIX E-PHORESIS SERUM: CPT

## 2024-07-18 PROCEDURE — 82103 ALPHA-1-ANTITRYPSIN TOTAL: CPT

## 2024-07-18 PROCEDURE — 82390 ASSAY OF CERULOPLASMIN: CPT

## 2024-07-18 RX ORDER — SODIUM PICOSULFATE, MAGNESIUM OXIDE, AND ANHYDROUS CITRIC ACID 12; 3.5; 1 G/175ML; G/175ML; MG/175ML
175 LIQUID ORAL TAKE AS DIRECTED
Qty: 175 ML | Refills: 0 | Status: SHIPPED | OUTPATIENT
Start: 2024-07-18

## 2024-07-18 NOTE — PROGRESS NOTES
Chief Complaint  ELEVATED LFTS  (Patient states no issues at this time. ) and Liver cyst    Mike Patel is a 54 y.o. male who presents to [facility], [place] on referral from GUY Sewell for a gastroenterology evaluation of elevated liver enzymes.      History of Present Illness  New patient presents to the office for elevated liver enzymes. Drinks alcohol on occasion but not on a weekly placed. Denies RUQ pain, IV drug use, unprofessional tattoos, history of or exposure to hepatitis, history of blood transfusions, and family history of liver disease. No GI complaints.     US liver 2/8/24 - 1.8cm liver cyst otherwise normal.     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    Knee swelling 4/1/2023    Right knee    Seasonal allergies     Shoulder joint derangement 2019       Past Surgical History:   Procedure Laterality Date    HERNIA REPAIR  1976    VASECTOMY  2013         Current Outpatient Medications:     atorvastatin (LIPITOR) 20 MG tablet, TAKE 1 TABLET BY MOUTH DAILY, Disp: 90 tablet, Rfl: 1    diclofenac (VOLTAREN) 50 MG EC tablet, TAKE 1 TABLET BY MOUTH TWICE DAILY AS NEEDED FOR PAIN, Disp: 60 tablet, Rfl: 2    loratadine (CLARITIN) 10 MG tablet, loratadine 10 mg oral tablet take 1 tablet (10 mg) by oral route once daily   Active, Disp: , Rfl:     Multiple Vitamins-Minerals (MULTIVITAMIN GUMMIES MENS PO), multivitamin oral capsule take 1 capsule by oral route daily   Active, Disp: , Rfl:     vitamin C (ASCORBIC ACID) 250 MG tablet, Take 1 tablet by mouth Daily., Disp: , Rfl:     Sod Picosulfate-Mag Ox-Cit Acd (Clenpiq) 10-3.5-12 MG-GM -GM/175ML solution, Take 175 mL by mouth Take As Directed. Please follow colon prep  "instructions provided by office., Disp: 175 mL, Rfl: 0     Allergies   Allergen Reactions    Amoxicillin Hives       Family History   Problem Relation Age of Onset    Colon polyps Mother     COPD Mother     Rashes / Skin problems Mother     Hodgkin's lymphoma Maternal Grandmother     COPD Maternal Grandfather     Colon cancer Neg Hx         Social History     Social History Narrative    Not on file       Immunization:  Immunization History   Administered Date(s) Administered    COVID-19 (PFIZER) Purple Cap Monovalent 04/13/2021, 05/04/2021, 01/12/2022    COVID-19 F23 (MODERNA) 12YRS+ (SPIKEVAX) 09/20/2023    Fluzone Quad >6mos (Multi-dose) 10/16/2019    Influenza, Unspecified 10/16/2019    Tdap 06/17/2021        Objective     Vital Signs:   /88 (BP Location: Right arm, Patient Position: Sitting, Cuff Size: Adult)   Pulse 63   Ht 170 cm (66.93\")   Wt 87.5 kg (193 lb)   BMI 30.29 kg/m²       Physical Exam  Constitutional:       Appearance: Normal appearance. He is normal weight.   HENT:      Head: Normocephalic and atraumatic.      Nose: Nose normal.   Pulmonary:      Effort: Pulmonary effort is normal.   Skin:     General: Skin is warm and dry.   Neurological:      Mental Status: He is alert and oriented to person, place, and time. Mental status is at baseline.   Psychiatric:         Mood and Affect: Mood normal.         Behavior: Behavior normal.         Thought Content: Thought content normal.         Judgment: Judgment normal.         Result Review :     CBC w/diff          1/17/2024    08:33   CBC w/Diff   WBC 6.41    RBC 5.28    Hemoglobin 15.6    Hematocrit 45.9    MCV 86.9    MCH 29.5    MCHC 34.0    RDW 12.1    Platelets 235    Neutrophil Rel % 49.8    Immature Granulocyte Rel % 0.3    Lymphocyte Rel % 37.4    Monocyte Rel % 8.9    Eosinophil Rel % 2.7    Basophil Rel % 0.9      CMP          1/17/2024    08:33 4/1/2024    15:10   CMP   Glucose 90     BUN 20     Creatinine 0.95     EGFR 95.7   "   Sodium 139     Potassium 4.6     Chloride 103     Calcium 9.6     Total Protein 6.8  6.3    Albumin 4.4  4.5    Globulin 2.4     Total Bilirubin 0.8  0.3    Alkaline Phosphatase 101  116    AST (SGOT) 27  28    ALT (SGPT) 49  43    Albumin/Globulin Ratio 1.8     BUN/Creatinine Ratio 21.1     Anion Gap 9.8         Acute HEP Panel   Hepatitis B Surface Ag   Date Value Ref Range Status   03/29/2024 Non-Reactive Non-Reactive Final     Hep A IgM   Date Value Ref Range Status   04/01/2024 Non-Reactive Non-Reactive Final     Hepatitis C Ab   Date Value Ref Range Status   03/29/2024 Non-Reactive Non-Reactive Final           Assessment and Plan    Diagnoses and all orders for this visit:    1. Elevated liver enzymes (Primary)  -     AFP Tumor Marker; Future  -     Alpha - 1 - Antitrypsin; Future  -     ROMAIN; Future  -     Hepatic Function Panel; Future  -     Iron Profile; Future  -     Ferritin; Future  -     Copper, Serum; Future  -     Protime-INR; Future  -     Ceruloplasmin; Future  -     Mitochondrial Antibodies, M2; Future  -     Anti-Smooth Muscle Antibody Titer; Future  -     Immunofixation, Serum; Future    2. Encounter for screening for malignant neoplasm of colon  -     Case Request; Standing  -     Follow Anesthesia Guidelines / Protocol; Standing  -     Verify NPO; Standing  -     Obtain Informed Consent; Standing  -     Case Request    Other orders  -     Sod Picosulfate-Mag Ox-Cit Acd (Clenpiq) 10-3.5-12 MG-GM -GM/175ML solution; Take 175 mL by mouth Take As Directed. Please follow colon prep instructions provided by office.  Dispense: 175 mL; Refill: 0    Liver labs.   Denies cardiopulmonary  Screening COLONOSCOPY Surgical Risk and Benefits: Possible risk/complications, benefits, and alternatives to surgical or invasive procedure have been explained to patient and/or legal guardian. Risks include bleeding, infection, and perforation. Patient has been evaluated and can tolerate anesthesia and/or sedation.  Risk, benefits, and alternatives to anesthesia and sedation have been explained to patient and/or legal guardian.     Follow Up   Return if symptoms worsen or fail to improve.  Patient was given instructions and counseling regarding his condition or for health maintenance advice. Please see specific information pulled into the AVS if appropriate.

## 2024-07-20 LAB
MITOCHONDRIA M2 IGG SER-ACNC: <20 UNITS (ref 0–20)
SMA IGG SER-ACNC: 2 UNITS (ref 0–19)

## 2024-07-22 ENCOUNTER — TELEPHONE (OUTPATIENT)
Dept: GASTROENTEROLOGY | Facility: CLINIC | Age: 54
End: 2024-07-22
Payer: OTHER GOVERNMENT

## 2024-07-22 LAB
ANA SER QL: NEGATIVE
IGA SERPL-MCNC: 132 MG/DL (ref 90–386)
IGG SERPL-MCNC: 843 MG/DL (ref 603–1613)
IGM SERPL-MCNC: 126 MG/DL (ref 20–172)
PROT PATTERN SERPL IFE-IMP: NORMAL

## 2024-07-22 NOTE — TELEPHONE ENCOUNTER
----- Message from Nadeen Mitchell sent at 7/21/2024  8:28 PM EDT -----  I have reviewed the patient's most recent liver work-up which is normal ruling out underlying hepatitis, autoimmune, and genetic cause for elevated liver enzymes. Repeat liver enzymes are normal. Iron is decreased, recommend taking OTC multivitamin with iron. Maintain appointment for screening colonoscopy.

## 2024-07-23 LAB — COPPER SERPL-MCNC: 98 UG/DL (ref 69–132)

## 2024-07-24 ENCOUNTER — OFFICE VISIT (OUTPATIENT)
Dept: FAMILY MEDICINE CLINIC | Facility: CLINIC | Age: 54
End: 2024-07-24
Payer: OTHER GOVERNMENT

## 2024-07-24 ENCOUNTER — LAB (OUTPATIENT)
Dept: LAB | Facility: HOSPITAL | Age: 54
End: 2024-07-24
Payer: OTHER GOVERNMENT

## 2024-07-24 VITALS
WEIGHT: 187 LBS | OXYGEN SATURATION: 98 % | DIASTOLIC BLOOD PRESSURE: 80 MMHG | BODY MASS INDEX: 29.35 KG/M2 | HEART RATE: 61 BPM | SYSTOLIC BLOOD PRESSURE: 113 MMHG | HEIGHT: 67 IN

## 2024-07-24 DIAGNOSIS — J30.2 SEASONAL ALLERGIC RHINITIS, UNSPECIFIED TRIGGER: ICD-10-CM

## 2024-07-24 DIAGNOSIS — E78.5 HYPERLIPIDEMIA, UNSPECIFIED HYPERLIPIDEMIA TYPE: Primary | ICD-10-CM

## 2024-07-24 DIAGNOSIS — E78.5 HYPERLIPIDEMIA, UNSPECIFIED HYPERLIPIDEMIA TYPE: ICD-10-CM

## 2024-07-24 DIAGNOSIS — H65.91 RIGHT SEROUS OTITIS MEDIA, UNSPECIFIED CHRONICITY: ICD-10-CM

## 2024-07-24 DIAGNOSIS — Z13.29 THYROID DISORDER SCREEN: ICD-10-CM

## 2024-07-24 DIAGNOSIS — E66.3 OVER WEIGHT: ICD-10-CM

## 2024-07-24 LAB
ALBUMIN SERPL-MCNC: 4.4 G/DL (ref 3.5–5.2)
ALBUMIN/GLOB SERPL: 1.9 G/DL
ALP SERPL-CCNC: 101 U/L (ref 39–117)
ALT SERPL W P-5'-P-CCNC: 36 U/L (ref 1–41)
ANION GAP SERPL CALCULATED.3IONS-SCNC: 10.8 MMOL/L (ref 5–15)
AST SERPL-CCNC: 31 U/L (ref 1–40)
BASOPHILS # BLD AUTO: 0.06 10*3/MM3 (ref 0–0.2)
BASOPHILS NFR BLD AUTO: 0.9 % (ref 0–1.5)
BILIRUB SERPL-MCNC: 0.6 MG/DL (ref 0–1.2)
BUN SERPL-MCNC: 15 MG/DL (ref 6–20)
BUN/CREAT SERPL: 16.1 (ref 7–25)
CALCIUM SPEC-SCNC: 9.1 MG/DL (ref 8.6–10.5)
CHLORIDE SERPL-SCNC: 106 MMOL/L (ref 98–107)
CHOLEST SERPL-MCNC: 133 MG/DL (ref 0–200)
CO2 SERPL-SCNC: 22.2 MMOL/L (ref 22–29)
CREAT SERPL-MCNC: 0.93 MG/DL (ref 0.76–1.27)
DEPRECATED RDW RBC AUTO: 38.8 FL (ref 37–54)
EGFRCR SERPLBLD CKD-EPI 2021: 97.6 ML/MIN/1.73
EOSINOPHIL # BLD AUTO: 0.33 10*3/MM3 (ref 0–0.4)
EOSINOPHIL NFR BLD AUTO: 5 % (ref 0.3–6.2)
ERYTHROCYTE [DISTWIDTH] IN BLOOD BY AUTOMATED COUNT: 12.2 % (ref 12.3–15.4)
GLOBULIN UR ELPH-MCNC: 2.3 GM/DL
GLUCOSE SERPL-MCNC: 88 MG/DL (ref 65–99)
HCT VFR BLD AUTO: 44.7 % (ref 37.5–51)
HDLC SERPL-MCNC: 48 MG/DL (ref 40–60)
HGB BLD-MCNC: 14.9 G/DL (ref 13–17.7)
IMM GRANULOCYTES # BLD AUTO: 0.02 10*3/MM3 (ref 0–0.05)
IMM GRANULOCYTES NFR BLD AUTO: 0.3 % (ref 0–0.5)
LDLC SERPL CALC-MCNC: 68 MG/DL (ref 0–100)
LDLC/HDLC SERPL: 1.4 {RATIO}
LYMPHOCYTES # BLD AUTO: 2.11 10*3/MM3 (ref 0.7–3.1)
LYMPHOCYTES NFR BLD AUTO: 31.7 % (ref 19.6–45.3)
MCH RBC QN AUTO: 29.2 PG (ref 26.6–33)
MCHC RBC AUTO-ENTMCNC: 33.3 G/DL (ref 31.5–35.7)
MCV RBC AUTO: 87.6 FL (ref 79–97)
MONOCYTES # BLD AUTO: 0.52 10*3/MM3 (ref 0.1–0.9)
MONOCYTES NFR BLD AUTO: 7.8 % (ref 5–12)
NEUTROPHILS NFR BLD AUTO: 3.61 10*3/MM3 (ref 1.7–7)
NEUTROPHILS NFR BLD AUTO: 54.3 % (ref 42.7–76)
NRBC BLD AUTO-RTO: 0 /100 WBC (ref 0–0.2)
PLATELET # BLD AUTO: 225 10*3/MM3 (ref 140–450)
PMV BLD AUTO: 11.3 FL (ref 6–12)
POTASSIUM SERPL-SCNC: 4.6 MMOL/L (ref 3.5–5.2)
PROT SERPL-MCNC: 6.7 G/DL (ref 6–8.5)
RBC # BLD AUTO: 5.1 10*6/MM3 (ref 4.14–5.8)
SODIUM SERPL-SCNC: 139 MMOL/L (ref 136–145)
TRIGL SERPL-MCNC: 89 MG/DL (ref 0–150)
TSH SERPL DL<=0.05 MIU/L-ACNC: 1.79 UIU/ML (ref 0.27–4.2)
VLDLC SERPL-MCNC: 17 MG/DL (ref 5–40)
WBC NRBC COR # BLD AUTO: 6.65 10*3/MM3 (ref 3.4–10.8)

## 2024-07-24 PROCEDURE — 80053 COMPREHEN METABOLIC PANEL: CPT

## 2024-07-24 PROCEDURE — 85025 COMPLETE CBC W/AUTO DIFF WBC: CPT

## 2024-07-24 PROCEDURE — 36415 COLL VENOUS BLD VENIPUNCTURE: CPT

## 2024-07-24 PROCEDURE — 99214 OFFICE O/P EST MOD 30 MIN: CPT | Performed by: NURSE PRACTITIONER

## 2024-07-24 PROCEDURE — 80061 LIPID PANEL: CPT

## 2024-07-24 PROCEDURE — 84443 ASSAY THYROID STIM HORMONE: CPT

## 2024-07-24 RX ORDER — FLUTICASONE PROPIONATE 50 MCG
2 SPRAY, SUSPENSION (ML) NASAL DAILY
Qty: 48 G | Refills: 1 | Status: SHIPPED | OUTPATIENT
Start: 2024-07-24

## 2024-07-24 RX ORDER — FLUTICASONE PROPIONATE 50 MCG
2 SPRAY, SUSPENSION (ML) NASAL DAILY
Qty: 11.1 ML | Refills: 0 | Status: SHIPPED | OUTPATIENT
Start: 2024-07-24 | End: 2024-07-24

## 2024-07-24 RX ORDER — METHYLPREDNISOLONE 4 MG/1
TABLET ORAL
Qty: 1 EACH | Refills: 0 | Status: SHIPPED | OUTPATIENT
Start: 2024-07-24

## 2024-07-24 NOTE — ASSESSMENT & PLAN NOTE
Patient's (Body mass index is 29.35 kg/m².) indicates that they are overweight with health conditions that include dyslipidemias . Weight is improving with lifestyle modifications. BMI is is above average; BMI management plan is completed. We discussed portion control and increasing exercise.

## 2024-07-24 NOTE — PROGRESS NOTES
Chief Complaint  Hyperlipidemia and Dizziness (/)    SUBJECTIVE  Mike Patel presents to [facility] for six month follow up on Hyperlipidemia. Pt states he has been having some vertigo-like spells, he is not sure if he ana fluid on his ears, feels full.  States he has had issues with this in the past.  States he has felt a little spinning with position changes a few times     Colonoscopy is scheduled for 09/13/24 with Dr Haji. Lab work completed by GI shows low iron. They told him to start multivit with iron       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    Knee swelling 4/1/2023    Right knee    Seasonal allergies     Shoulder joint derangement 2019      Family History   Problem Relation Age of Onset    Colon polyps Mother     COPD Mother     Rashes / Skin problems Mother     Hodgkin's lymphoma Maternal Grandmother     COPD Maternal Grandfather     Colon cancer Neg Hx       Past Surgical History:   Procedure Laterality Date    HERNIA REPAIR  1976    VASECTOMY  2013        Current Outpatient Medications:     atorvastatin (LIPITOR) 20 MG tablet, TAKE 1 TABLET BY MOUTH DAILY, Disp: 90 tablet, Rfl: 1    diclofenac (VOLTAREN) 50 MG EC tablet, TAKE 1 TABLET BY MOUTH TWICE DAILY AS NEEDED FOR PAIN, Disp: 60 tablet, Rfl: 2    loratadine (CLARITIN) 10 MG tablet, loratadine 10 mg oral tablet take 1 tablet (10 mg) by oral route once daily   Active, Disp: , Rfl:     Multiple Vitamins-Minerals (MULTIVITAMIN GUMMIES MENS PO), multivitamin oral capsule take 1 capsule by oral route daily   Active, Disp: , Rfl:     Sod Picosulfate-Mag Ox-Cit Acd (Clenpiq) 10-3.5-12 MG-GM -GM/175ML solution, Take 175 mL by mouth Take As  "Directed. Please follow colon prep instructions provided by office., Disp: 175 mL, Rfl: 0    vitamin C (ASCORBIC ACID) 250 MG tablet, Take 1 tablet by mouth Daily., Disp: , Rfl:     fluticasone (FLONASE) 50 MCG/ACT nasal spray, INSTILL 2 SPRAYS INTO EACH NOSTRIL EVERY DAY, Disp: 48 g, Rfl: 1    methylPREDNISolone (MEDROL) 4 MG dose pack, Take as directed on package instructions., Disp: 1 each, Rfl: 0    OBJECTIVE  Vital Signs:   /80   Pulse 61   Ht 170 cm (66.93\")   Wt 84.8 kg (187 lb)   SpO2 98%   BMI 29.35 kg/m²    Estimated body mass index is 29.35 kg/m² as calculated from the following:    Height as of this encounter: 170 cm (66.93\").    Weight as of this encounter: 84.8 kg (187 lb).     Wt Readings from Last 3 Encounters:   07/24/24 84.8 kg (187 lb)   07/18/24 87.5 kg (193 lb)   01/17/24 88 kg (194 lb)     BP Readings from Last 3 Encounters:   07/24/24 113/80   07/18/24 118/88   01/17/24 121/87       Physical Exam  Vitals reviewed.   Constitutional:       Appearance: Normal appearance. He is well-developed.   HENT:      Head: Normocephalic and atraumatic.      Right Ear: Ear canal and external ear normal.      Left Ear: Tympanic membrane, ear canal and external ear normal.      Ears:      Comments: Moderate amount of fluid right TM  Eyes:      Conjunctiva/sclera: Conjunctivae normal.      Pupils: Pupils are equal, round, and reactive to light.   Cardiovascular:      Rate and Rhythm: Normal rate and regular rhythm.      Heart sounds: No murmur heard.     No friction rub. No gallop.   Pulmonary:      Effort: Pulmonary effort is normal.      Breath sounds: Normal breath sounds. No wheezing or rhonchi.   Skin:     General: Skin is warm and dry.   Neurological:      Mental Status: He is alert and oriented to person, place, and time.      Cranial Nerves: No cranial nerve deficit.   Psychiatric:         Mood and Affect: Mood and affect normal.         Behavior: Behavior normal.         Thought Content: " Thought content normal.         Judgment: Judgment normal.          Result Review    CMP          1/17/2024    08:33 4/1/2024    15:10 7/18/2024    14:56   CMP   Glucose 90      BUN 20      Creatinine 0.95      EGFR 95.7      Sodium 139      Potassium 4.6      Chloride 103      Calcium 9.6      Total Protein 6.8  6.3  7.1    Albumin 4.4  4.5  4.4    Globulin 2.4      Total Bilirubin 0.8  0.3  0.4    Alkaline Phosphatase 101  116  112    AST (SGOT) 27  28  16    ALT (SGPT) 49  43  40    Albumin/Globulin Ratio 1.8      BUN/Creatinine Ratio 21.1      Anion Gap 9.8        CBC          1/17/2024    08:33   CBC   WBC 6.41    RBC 5.28    Hemoglobin 15.6    Hematocrit 45.9    MCV 86.9    MCH 29.5    MCHC 34.0    RDW 12.1    Platelets 235      Lipid Panel          1/17/2024    08:33   Lipid Panel   Total Cholesterol 135    Triglycerides 125    HDL Cholesterol 40    VLDL Cholesterol 22    LDL Cholesterol  73    LDL/HDL Ratio 1.75      TSH          1/17/2024    08:33   TSH   TSH 1.690              No Images in the past 120 days found..     The above data has been reviewed by GUY Cox 07/24/2024 08:38 EDT.          Patient Care Team:  Maddy Bella APRN as PCP - General (Nurse Practitioner)            ASSESSMENT & PLAN    Diagnoses and all orders for this visit:    1. Hyperlipidemia, unspecified hyperlipidemia type (Primary)  Assessment & Plan:   Stable on lipitor, cont current medication     Orders:  -     Comprehensive Metabolic Panel; Future  -     CBC & Differential; Future  -     Lipid Panel; Future    2. Right serous otitis media, unspecified chronicity  Comments:  If symptoms do not resolve or worsen follow-up for reevaluation  Orders:  -     methylPREDNISolone (MEDROL) 4 MG dose pack; Take as directed on package instructions.  Dispense: 1 each; Refill: 0  -     Discontinue: fluticasone (FLONASE) 50 MCG/ACT nasal spray; 2 sprays into the nostril(s) as directed by provider Daily.  Dispense: 11.1 mL;  Refill: 0    3. Thyroid disorder screen  -     TSH Rfx On Abnormal To Free T4; Future    4. Over weight  Assessment & Plan:  Patient's (Body mass index is 29.35 kg/m².) indicates that they are overweight with health conditions that include dyslipidemias . Weight is improving with lifestyle modifications. BMI is is above average; BMI management plan is completed. We discussed portion control and increasing exercise.       5. Seasonal allergic rhinitis, unspecified trigger  Overview:  Stable and well-controlled Claritin, continue current dose           Tobacco Use: Medium Risk (7/24/2024)    Patient History     Smoking Tobacco Use: Former     Smokeless Tobacco Use: Former     Passive Exposure: Not on file       Follow Up     Return in about 6 months (around 1/24/2025), or if symptoms worsen or fail to improve.        Patient was given instructions and counseling regarding his condition or for health maintenance advice. Please see specific information pulled into the AVS if appropriate.   I have reviewed information obtained and documented by others and I have confirmed the accuracy of this documented note.    GUY Cox

## 2024-08-14 ENCOUNTER — HOSPITAL ENCOUNTER (OUTPATIENT)
Dept: ULTRASOUND IMAGING | Facility: HOSPITAL | Age: 54
Discharge: HOME | End: 2024-08-14
Admitting: NURSE PRACTITIONER
Payer: OTHER GOVERNMENT

## 2024-08-14 DIAGNOSIS — R93.429 ABNORMAL RENAL ULTRASOUND: ICD-10-CM

## 2024-08-14 DIAGNOSIS — D73.89 LESION OF SPLEEN: Primary | ICD-10-CM

## 2024-08-14 PROCEDURE — 76775 US EXAM ABDO BACK WALL LIM: CPT

## 2024-08-26 ENCOUNTER — HOSPITAL ENCOUNTER (OUTPATIENT)
Dept: ULTRASOUND IMAGING | Facility: HOSPITAL | Age: 54
Discharge: HOME | End: 2024-08-26
Admitting: NURSE PRACTITIONER
Payer: OTHER GOVERNMENT

## 2024-08-26 DIAGNOSIS — R16.1 SPLEEN ENLARGEMENT: ICD-10-CM

## 2024-08-26 DIAGNOSIS — D73.4 CYST OF SPLEEN: ICD-10-CM

## 2024-08-26 DIAGNOSIS — D73.89 LESION OF SPLEEN: ICD-10-CM

## 2024-08-26 DIAGNOSIS — D73.89 LESION OF SPLEEN: Primary | ICD-10-CM

## 2024-08-26 PROCEDURE — 76705 ECHO EXAM OF ABDOMEN: CPT

## 2024-09-09 NOTE — PRE-PROCEDURE INSTRUCTIONS
"PAT call attempted.  No answer.  Detailed message with date and arrival time of 0800 given.  Instructed that arrival time is not procedure time but allows time to prepare for procedure. Come to entrance \"C\"; must have adult  for transportation home; may have two visitors; however, children under 12 must remain in waiting area; instructed on diet/clear liquids/NPO/bowel prep, if needed; may take normal meds two hours prior to arrival time except for blood thinners, antidiabetics, diuretics, and weight loss meds.  Instructed to return call to confirm receipt of instructions and for any questions.  "

## 2024-09-12 ENCOUNTER — ANESTHESIA EVENT (OUTPATIENT)
Dept: GASTROENTEROLOGY | Facility: HOSPITAL | Age: 54
End: 2024-09-12
Payer: OTHER GOVERNMENT

## 2024-09-12 NOTE — ANESTHESIA PREPROCEDURE EVALUATION
Anesthesia Evaluation     Nursing notes reviewed   NPO Solid Status: > 2 hours  NPO Liquid Status: > 8 hours           Airway   Mallampati: II  TM distance: >3 FB  Neck ROM: full  No difficulty expected  Dental - normal exam     Pulmonary - normal exam    breath sounds clear to auscultation  (+) a smoker Former,  Cardiovascular - normal exam    Rhythm: regular  Rate: normal    (+) hyperlipidemia      Neuro/Psych  GI/Hepatic/Renal/Endo    (+) obesity, renal disease- stones    Musculoskeletal     Abdominal    Substance History      OB/GYN          Other   arthritis,   history of cancer                  Anesthesia Plan    ASA 2     general     (Total IV Anesthesia    Patient understands anesthesia not responsible for dental damage.  )  intravenous induction     Anesthetic plan, risks, benefits, and alternatives have been provided, discussed and informed consent has been obtained with: patient.    Plan discussed with CRNA.      CODE STATUS:

## 2024-09-13 ENCOUNTER — ANESTHESIA (OUTPATIENT)
Dept: GASTROENTEROLOGY | Facility: HOSPITAL | Age: 54
End: 2024-09-13
Payer: OTHER GOVERNMENT

## 2024-09-13 ENCOUNTER — HOSPITAL ENCOUNTER (OUTPATIENT)
Facility: HOSPITAL | Age: 54
Setting detail: HOSPITAL OUTPATIENT SURGERY
Discharge: HOME | End: 2024-09-13
Attending: INTERNAL MEDICINE | Admitting: INTERNAL MEDICINE
Payer: OTHER GOVERNMENT

## 2024-09-13 VITALS
WEIGHT: 182.98 LBS | RESPIRATION RATE: 13 BRPM | HEART RATE: 63 BPM | SYSTOLIC BLOOD PRESSURE: 108 MMHG | TEMPERATURE: 97 F | BODY MASS INDEX: 28.72 KG/M2 | OXYGEN SATURATION: 97 % | DIASTOLIC BLOOD PRESSURE: 72 MMHG

## 2024-09-13 DIAGNOSIS — Z12.11 ENCOUNTER FOR SCREENING FOR MALIGNANT NEOPLASM OF COLON: ICD-10-CM

## 2024-09-13 PROCEDURE — 25010000002 PROPOFOL 10 MG/ML EMULSION: Performed by: NURSE ANESTHETIST, CERTIFIED REGISTERED

## 2024-09-13 PROCEDURE — 88305 TISSUE EXAM BY PATHOLOGIST: CPT | Performed by: INTERNAL MEDICINE

## 2024-09-13 PROCEDURE — 25810000003 LACTATED RINGERS PER 1000 ML: Performed by: NURSE ANESTHETIST, CERTIFIED REGISTERED

## 2024-09-13 PROCEDURE — 45385 COLONOSCOPY W/LESION REMOVAL: CPT | Performed by: INTERNAL MEDICINE

## 2024-09-13 RX ORDER — SODIUM CHLORIDE, SODIUM LACTATE, POTASSIUM CHLORIDE, CALCIUM CHLORIDE 600; 310; 30; 20 MG/100ML; MG/100ML; MG/100ML; MG/100ML
30 INJECTION, SOLUTION INTRAVENOUS CONTINUOUS
Status: DISCONTINUED | OUTPATIENT
Start: 2024-09-13 | End: 2024-09-13 | Stop reason: HOSPADM

## 2024-09-13 RX ORDER — PROPOFOL 10 MG/ML
VIAL (ML) INTRAVENOUS AS NEEDED
Status: DISCONTINUED | OUTPATIENT
Start: 2024-09-13 | End: 2024-09-13 | Stop reason: SURG

## 2024-09-13 RX ORDER — LIDOCAINE HYDROCHLORIDE 20 MG/ML
INJECTION, SOLUTION EPIDURAL; INFILTRATION; INTRACAUDAL; PERINEURAL AS NEEDED
Status: DISCONTINUED | OUTPATIENT
Start: 2024-09-13 | End: 2024-09-13 | Stop reason: SURG

## 2024-09-13 RX ADMIN — LIDOCAINE HYDROCHLORIDE 60 MG: 20 INJECTION, SOLUTION EPIDURAL; INFILTRATION; INTRACAUDAL; PERINEURAL at 09:52

## 2024-09-13 RX ADMIN — PROPOFOL 100 MG: 10 INJECTION, EMULSION INTRAVENOUS at 09:52

## 2024-09-13 RX ADMIN — PROPOFOL 200 MCG/KG/MIN: 10 INJECTION, EMULSION INTRAVENOUS at 09:52

## 2024-09-13 RX ADMIN — SODIUM CHLORIDE, POTASSIUM CHLORIDE, SODIUM LACTATE AND CALCIUM CHLORIDE 30 ML/HR: 600; 310; 30; 20 INJECTION, SOLUTION INTRAVENOUS at 08:39

## 2024-09-13 NOTE — H&P
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 His smoking use included cigarettes. He started smoking about 15 years ago. He has a 2 pack-year smoking history. He quit smokeless tobacco use about 7 years ago.  His smokeless tobacco use included chew. He reports current alcohol use of about 15.0 standard drinks of alcohol per week. He reports that he does not use drugs.    Medications:   Medications Prior to Admission   Medication Sig Dispense Refill Last Dose    atorvastatin (LIPITOR) 20 MG tablet TAKE 1 TABLET BY MOUTH DAILY 90 tablet 1 9/12/2024    loratadine (CLARITIN) 10 MG tablet loratadine 10 mg oral tablet take 1 tablet (10 mg) by oral route once daily   Active   9/12/2024    Multiple Vitamins-Minerals (MULTIVITAMIN GUMMIES MENS PO) multivitamin oral capsule take 1 capsule by oral route daily   Active   9/12/2024    vitamin C (ASCORBIC ACID) 250 MG tablet Take 1 tablet by mouth Daily.   9/12/2024    diclofenac (VOLTAREN) 50 MG EC tablet TAKE 1 TABLET BY MOUTH TWICE DAILY AS NEEDED FOR PAIN 60 tablet 2      fluticasone (FLONASE) 50 MCG/ACT nasal spray INSTILL 2 SPRAYS INTO EACH NOSTRIL EVERY DAY 48 g 1        Allergies:  Amoxicillin        Objective     Blood pressure 126/98, pulse 65, temperature 97.6 °F (36.4 °C), temperature source Temporal, resp. rate 18, weight 83 kg (182 lb 15.7 oz), SpO2 97%.    Physical Exam   Constitutional: Pt is oriented to person, place, and time and well-developed, well-nourished, and in no distress.   Mouth/Throat: Oropharynx is clear and moist.   Neck: Normal range of motion.   Cardiovascular: Normal rate, regular rhythm and normal heart sounds.    Pulmonary/Chest: Effort normal and breath sounds normal.   Abdominal: Soft. Nontender  Skin: Skin is warm and dry.   Psychiatric: Mood, memory, affect and judgment normal.     Assessment & Plan     Diagnosis:  Cancer screening    Anticipated Surgical Procedure:  Colonoscopy    The risks, benefits, and alternatives of this procedure have been discussed with the patient or the responsible party- the patient understands and agrees to proceed.

## 2024-09-13 NOTE — ANESTHESIA POSTPROCEDURE EVALUATION
Patient: Mike Patel    Procedure Summary       Date: 09/13/24 Room / Location: [facility] 4 / [facility]    Anesthesia Start: 0949 Anesthesia Stop: 1019    Procedure: COLONOSCOPY WITH COLD SNARE POLYPECTOMY Diagnosis:       Encounter for screening for malignant neoplasm of colon      (Encounter for screening for malignant neoplasm of colon [Z12.11])    Surgeons: Mariano Haji MD Provider: Kenzie Smith CRNA    Anesthesia Type: general ASA Status: 2            Anesthesia Type: general    Vitals  Vitals Value Taken Time   BP 94/72 09/13/24 1043   Temp 36.1 °C (97 °F) 09/13/24 1037   Pulse 59 09/13/24 1043   Resp 13 09/13/24 1037   SpO2 96 % 09/13/24 1043   Vitals shown include unfiled device data.        Post Anesthesia Care and Evaluation    Post-procedure mental status: acceptable.  Pain management: satisfactory to patient    Airway patency: patent  Anesthetic complications: No anesthetic complications    Cardiovascular status: acceptable  Respiratory status: acceptable    Comments: Per chart review

## 2024-09-16 LAB
CYTO UR: NORMAL
LAB AP CASE REPORT: NORMAL
LAB AP CLINICAL INFORMATION: NORMAL
PATH REPORT.FINAL DX SPEC: NORMAL
PATH REPORT.GROSS SPEC: NORMAL

## 2024-09-23 DIAGNOSIS — E78.5 HYPERLIPIDEMIA, UNSPECIFIED HYPERLIPIDEMIA TYPE: ICD-10-CM

## 2024-09-24 RX ORDER — ATORVASTATIN CALCIUM 20 MG/1
20 TABLET, FILM COATED ORAL DAILY
Qty: 90 TABLET | Refills: 1 | Status: SHIPPED | OUTPATIENT
Start: 2024-09-24

## 2024-11-07 ENCOUNTER — LAB (OUTPATIENT)
Dept: LAB | Facility: HOSPITAL | Age: 54
End: 2024-11-07
Payer: OTHER GOVERNMENT

## 2024-11-07 ENCOUNTER — OFFICE VISIT (OUTPATIENT)
Dept: GASTROENTEROLOGY | Facility: CLINIC | Age: 54
End: 2024-11-07
Payer: OTHER GOVERNMENT

## 2024-11-07 VITALS
HEIGHT: 67 IN | OXYGEN SATURATION: 100 % | BODY MASS INDEX: 29.51 KG/M2 | SYSTOLIC BLOOD PRESSURE: 116 MMHG | HEART RATE: 70 BPM | DIASTOLIC BLOOD PRESSURE: 78 MMHG | WEIGHT: 188 LBS

## 2024-11-07 DIAGNOSIS — D50.9 IRON DEFICIENCY ANEMIA, UNSPECIFIED IRON DEFICIENCY ANEMIA TYPE: ICD-10-CM

## 2024-11-07 DIAGNOSIS — R74.8 ELEVATED LIVER ENZYMES: Primary | ICD-10-CM

## 2024-11-07 DIAGNOSIS — Z86.0100 HISTORY OF COLON POLYPS: ICD-10-CM

## 2024-11-07 LAB
BASOPHILS # BLD AUTO: 0.06 10*3/MM3 (ref 0–0.2)
BASOPHILS NFR BLD AUTO: 0.8 % (ref 0–1.5)
DEPRECATED RDW RBC AUTO: 37.3 FL (ref 37–54)
EOSINOPHIL # BLD AUTO: 0.17 10*3/MM3 (ref 0–0.4)
EOSINOPHIL NFR BLD AUTO: 2.3 % (ref 0.3–6.2)
ERYTHROCYTE [DISTWIDTH] IN BLOOD BY AUTOMATED COUNT: 11.8 % (ref 12.3–15.4)
FERRITIN SERPL-MCNC: 179 NG/ML (ref 30–400)
HCT VFR BLD AUTO: 44.8 % (ref 37.5–51)
HGB BLD-MCNC: 15.3 G/DL (ref 13–17.7)
IMM GRANULOCYTES # BLD AUTO: 0.02 10*3/MM3 (ref 0–0.05)
IMM GRANULOCYTES NFR BLD AUTO: 0.3 % (ref 0–0.5)
IRON 24H UR-MRATE: 98 MCG/DL (ref 59–158)
IRON SATN MFR SERPL: 25 % (ref 20–50)
LYMPHOCYTES # BLD AUTO: 2.66 10*3/MM3 (ref 0.7–3.1)
LYMPHOCYTES NFR BLD AUTO: 35.9 % (ref 19.6–45.3)
MCH RBC QN AUTO: 29.5 PG (ref 26.6–33)
MCHC RBC AUTO-ENTMCNC: 34.2 G/DL (ref 31.5–35.7)
MCV RBC AUTO: 86.5 FL (ref 79–97)
MONOCYTES # BLD AUTO: 0.6 10*3/MM3 (ref 0.1–0.9)
MONOCYTES NFR BLD AUTO: 8.1 % (ref 5–12)
NEUTROPHILS NFR BLD AUTO: 3.89 10*3/MM3 (ref 1.7–7)
NEUTROPHILS NFR BLD AUTO: 52.6 % (ref 42.7–76)
NRBC BLD AUTO-RTO: 0 /100 WBC (ref 0–0.2)
PLATELET # BLD AUTO: 242 10*3/MM3 (ref 140–450)
PMV BLD AUTO: 11 FL (ref 6–12)
RBC # BLD AUTO: 5.18 10*6/MM3 (ref 4.14–5.8)
TIBC SERPL-MCNC: 389 MCG/DL (ref 298–536)
TRANSFERRIN SERPL-MCNC: 261 MG/DL (ref 200–360)
WBC NRBC COR # BLD AUTO: 7.4 10*3/MM3 (ref 3.4–10.8)

## 2024-11-07 PROCEDURE — 82728 ASSAY OF FERRITIN: CPT

## 2024-11-07 PROCEDURE — 83540 ASSAY OF IRON: CPT

## 2024-11-07 PROCEDURE — 85025 COMPLETE CBC W/AUTO DIFF WBC: CPT

## 2024-11-07 PROCEDURE — 36415 COLL VENOUS BLD VENIPUNCTURE: CPT

## 2024-11-07 PROCEDURE — 84466 ASSAY OF TRANSFERRIN: CPT

## 2024-11-07 NOTE — PROGRESS NOTES
Chief Complaint  Follow-up and Elevated liver enzymes (Primary)    Mike Patel is a 54 y.o. male who presents to [facility] for follow up.     History of present Illness  Established care 7/18/2024 for elevated liver enzymes. Occasional alcohol use, not on a weekly basis. Denies family history of colon cancer.  US liver 2/8/24 - 1.8cm liver cyst otherwise normal   Liver work up 7/18/24 - normal. Repeat liver enzymes normal  Colonoscopy 9/13/2024 by Dr. Haji - 3 small hyperplastic polyps in descending and sigmoid colon. Repeat 5 years.     Patient presents to the office for follow up. Denies any GI complaints. Currently taking daily multivitamin with iron 3 days a week, when taking daily it caused constipation.      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 END[facility];  Service: Gastroenterology;  Laterality: N/A;  COLON POLYPS    HERNIA REPAIR  1976    VASECTOMY  2013         Current Outpatient Medications:     atorvastatin (LIPITOR) 20 MG tablet, TAKE 1 TABLET BY MOUTH DAILY, Disp: 90 tablet, Rfl: 1    fluticasone (FLONASE) 50 MCG/ACT nasal spray, INSTILL 2 SPRAYS INTO EACH NOSTRIL EVERY DAY, Disp: 48 g, Rfl: 1    Iron, Ferrous Sulfate, 325 (65 Fe) MG tablet, , Disp: , Rfl:     loratadine (CLARITIN) 10 MG tablet, loratadine 10 mg oral tablet take 1 tablet (10 mg) by oral route once daily   Active, Disp: , Rfl:     Multiple Vitamins-Minerals  "(MULTIVITAMIN GUMMIES MENS PO), multivitamin oral capsule take 1 capsule by oral route daily   Active, Disp: , Rfl:     vitamin C (ASCORBIC ACID) 250 MG tablet, Take 1 tablet by mouth Daily., Disp: , Rfl:     diclofenac (VOLTAREN) 50 MG EC tablet, TAKE 1 TABLET BY MOUTH TWICE DAILY AS NEEDED FOR PAIN, Disp: 60 tablet, Rfl: 2     Allergies   Allergen Reactions    Amoxicillin Hives       Family History   Problem Relation Age of Onset    Colon polyps Mother     COPD Mother     Rashes / Skin problems Mother     Hodgkin's lymphoma Maternal Grandmother     COPD Maternal Grandfather     Colon cancer Neg Hx         Social History     Social History Narrative    Not on file       Objective       Vital Signs:   /78 (BP Location: Right arm, Patient Position: Sitting, Cuff Size: Adult)   Pulse 70   Ht 170 cm (66.93\")   Wt 85.3 kg (188 lb)   SpO2 100%   BMI 29.51 kg/m²       Physical Exam  Constitutional:       Appearance: Normal appearance. He is normal weight.   HENT:      Head: Normocephalic and atraumatic.      Nose: Nose normal.   Pulmonary:      Effort: Pulmonary effort is normal.   Skin:     General: Skin is warm and dry.   Neurological:      Mental Status: He is alert and oriented to person, place, and time. Mental status is at baseline.   Psychiatric:         Mood and Affect: Mood normal.         Behavior: Behavior normal.         Thought Content: Thought content normal.         Judgment: Judgment normal.         Result Review :       CBC w/diff          1/17/2024    08:33 7/24/2024    09:42   CBC w/Diff   WBC 6.41  6.65    RBC 5.28  5.10    Hemoglobin 15.6  14.9    Hematocrit 45.9  44.7    MCV 86.9  87.6    MCH 29.5  29.2    MCHC 34.0  33.3    RDW 12.1  12.2    Platelets 235  225    Neutrophil Rel % 49.8  54.3    Immature Granulocyte Rel % 0.3  0.3    Lymphocyte Rel % 37.4  31.7    Monocyte Rel % 8.9  7.8    Eosinophil Rel % 2.7  5.0    Basophil Rel % 0.9  0.9      CMP          4/1/2024    15:10 7/18/2024    " 14:56 7/24/2024    09:42   CMP   Glucose   88    BUN   15    Creatinine   0.93    EGFR   97.6    Sodium   139    Potassium   4.6    Chloride   106    Calcium   9.1    Total Protein 6.3  7.1  6.7    Albumin 4.5  4.4  4.4    Globulin   2.3    Total Bilirubin 0.3  0.4  0.6    Alkaline Phosphatase 116  112  101    AST (SGOT) 28  16  31    ALT (SGPT) 43  40  36    Albumin/Globulin Ratio   1.9    BUN/Creatinine Ratio   16.1    Anion Gap   10.8        Liver Workup   ALPHA -1 ANTITRYPSIN   Date Value Ref Range Status   07/18/2024 132 90 - 200 mg/dL Final     dsDNA   Date Value Ref Range Status   03/04/2022 Negative Negative Final     Expanded MARK Screen   Date Value Ref Range Status   03/04/2022 Negative Negative Final     Smooth Muscle Ab   Date Value Ref Range Status   07/18/2024 2 0 - 19 Units Final     Comment:                      Negative                     0 - 19                   Weak positive               20 - 30                   Moderate to strong positive     >30   Actin Antibodies are found in 52-85% of patients with   autoimmune hepatitis or chronic active hepatitis and   in 22% of patients with primary biliary cirrhosis.     Ceruloplasmin   Date Value Ref Range Status   07/18/2024 20 16 - 31 mg/dL Final     Ferritin   Date Value Ref Range Status   07/18/2024 171.00 30.00 - 400.00 ng/mL Final     Immunofixation Result, Serum   Date Value Ref Range Status   07/18/2024 Comment  Final     Comment:     No monoclonality detected.     IgG   Date Value Ref Range Status   07/18/2024 843 603 - 1613 mg/dL Final     IgA   Date Value Ref Range Status   07/18/2024 132 90 - 386 mg/dL Final     IgM   Date Value Ref Range Status   07/18/2024 126 20 - 172 mg/dL Final     Iron   Date Value Ref Range Status   07/18/2024 48 (L) 59 - 158 mcg/dL Final     TIBC   Date Value Ref Range Status   07/18/2024 378 298 - 536 mcg/dL Final     Iron Saturation (TSAT)   Date Value Ref Range Status   07/18/2024 13 (L) 20 - 50 % Final      Transferrin   Date Value Ref Range Status   07/18/2024 254 200 - 360 mg/dL Final     Mitochondrial Ab   Date Value Ref Range Status   07/18/2024 <20.0 0.0 - 20.0 Units Final     Comment:                                     Negative    0.0 - 20.0                                  Equivocal  20.1 - 24.9                                  Positive         >24.9  Mitochondrial (M2) Antibodies are found in 90-96% of  patients with primary biliary cirrhosis.     Protime   Date Value Ref Range Status   07/18/2024 12.9 11.8 - 14.9 Seconds Final     INR   Date Value Ref Range Status   07/18/2024 0.95 0.86 - 1.15 Final     ALPHA-FETOPROTEIN   Date Value Ref Range Status   07/18/2024 3.71 0 - 8.3 ng/mL Final           Assessment and Plan    Diagnoses and all orders for this visit:    1. Elevated liver enzymes (Primary)    2. History of colon polyps    3. Iron deficiency anemia, unspecified iron deficiency anemia type  -     CBC & Differential; Future  -     Iron Profile; Future  -     Ferritin; Future    Reviewed previous liver work up with patient. Repeat liver enzymes normal. Continue trending with PCP, return to office for persistent elevation, decreased platelets, or other signs/symptoms of liver disease  Labs ordered to reassess iron  Reviewed most recent colonoscopy - 5 year recall in place.     Follow Up   Return if symptoms worsen or fail to improve.  Patient was given instructions and counseling regarding his condition or for health maintenance advice. Please see specific information pulled into the AVS if appropriate.

## 2024-11-11 ENCOUNTER — TELEPHONE (OUTPATIENT)
Dept: GASTROENTEROLOGY | Facility: CLINIC | Age: 54
End: 2024-11-11
Payer: OTHER GOVERNMENT

## 2024-11-11 NOTE — TELEPHONE ENCOUNTER
----- Message from Nadeen Mitchell sent at 11/8/2024  7:43 AM EST -----  CBC is normal. Iron and ferritin normal.

## 2024-11-18 ENCOUNTER — HOSPITAL ENCOUNTER (OUTPATIENT)
Dept: ULTRASOUND IMAGING | Facility: HOSPITAL | Age: 54
Discharge: HOME | End: 2024-11-18
Admitting: NURSE PRACTITIONER
Payer: OTHER GOVERNMENT

## 2024-11-18 DIAGNOSIS — D73.4 CYST OF SPLEEN: ICD-10-CM

## 2024-11-18 DIAGNOSIS — R16.1 SPLEEN ENLARGEMENT: ICD-10-CM

## 2024-11-18 PROCEDURE — 76705 ECHO EXAM OF ABDOMEN: CPT

## 2024-11-20 DIAGNOSIS — D73.4 CYST OF SPLEEN: ICD-10-CM

## 2024-11-20 DIAGNOSIS — D73.89 LESION OF SPLEEN: Primary | ICD-10-CM

## 2025-01-08 ENCOUNTER — HOSPITAL ENCOUNTER (OUTPATIENT)
Dept: MRI IMAGING | Facility: HOSPITAL | Age: 55
Discharge: HOME | End: 2025-01-08
Admitting: NURSE PRACTITIONER
Payer: OTHER GOVERNMENT

## 2025-01-08 DIAGNOSIS — D73.89 LESION OF SPLEEN: ICD-10-CM

## 2025-01-08 DIAGNOSIS — D73.4 CYST OF SPLEEN: ICD-10-CM

## 2025-01-08 PROCEDURE — 25510000002 GADOBENATE DIMEGLUMINE 529 MG/ML SOLUTION: Performed by: NURSE PRACTITIONER

## 2025-01-08 PROCEDURE — 74183 MRI ABD W/O CNTR FLWD CNTR: CPT

## 2025-01-08 PROCEDURE — A9577 INJ MULTIHANCE: HCPCS | Performed by: NURSE PRACTITIONER

## 2025-01-08 RX ADMIN — GADOBENATE DIMEGLUMINE 18 ML: 529 INJECTION, SOLUTION INTRAVENOUS at 08:17

## 2025-01-18 DIAGNOSIS — H65.91 RIGHT SEROUS OTITIS MEDIA, UNSPECIFIED CHRONICITY: ICD-10-CM

## 2025-01-20 ENCOUNTER — OFFICE VISIT (OUTPATIENT)
Dept: FAMILY MEDICINE CLINIC | Facility: CLINIC | Age: 55
End: 2025-01-20
Payer: OTHER GOVERNMENT

## 2025-01-20 ENCOUNTER — LAB (OUTPATIENT)
Dept: LAB | Facility: HOSPITAL | Age: 55
End: 2025-01-20
Payer: OTHER GOVERNMENT

## 2025-01-20 VITALS
OXYGEN SATURATION: 98 % | HEART RATE: 66 BPM | TEMPERATURE: 97.9 F | DIASTOLIC BLOOD PRESSURE: 75 MMHG | WEIGHT: 186 LBS | SYSTOLIC BLOOD PRESSURE: 118 MMHG | BODY MASS INDEX: 29.19 KG/M2 | HEIGHT: 67 IN

## 2025-01-20 DIAGNOSIS — N28.1 KIDNEY CYSTS: ICD-10-CM

## 2025-01-20 DIAGNOSIS — D50.9 IRON DEFICIENCY ANEMIA, UNSPECIFIED IRON DEFICIENCY ANEMIA TYPE: ICD-10-CM

## 2025-01-20 DIAGNOSIS — Z13.29 THYROID DISORDER SCREEN: ICD-10-CM

## 2025-01-20 DIAGNOSIS — Z12.5 PROSTATE CANCER SCREENING: ICD-10-CM

## 2025-01-20 DIAGNOSIS — J30.2 SEASONAL ALLERGIC RHINITIS, UNSPECIFIED TRIGGER: ICD-10-CM

## 2025-01-20 DIAGNOSIS — E78.5 HYPERLIPIDEMIA, UNSPECIFIED HYPERLIPIDEMIA TYPE: ICD-10-CM

## 2025-01-20 DIAGNOSIS — Z13.29 THYROID DISORDER SCREEN: Primary | ICD-10-CM

## 2025-01-20 DIAGNOSIS — D73.4 CYST OF SPLEEN: ICD-10-CM

## 2025-01-20 LAB
ALBUMIN SERPL-MCNC: 4.3 G/DL (ref 3.5–5.2)
ALBUMIN/GLOB SERPL: 1.7 G/DL
ALP SERPL-CCNC: 107 U/L (ref 39–117)
ALT SERPL W P-5'-P-CCNC: 30 U/L (ref 1–41)
ANION GAP SERPL CALCULATED.3IONS-SCNC: 11 MMOL/L (ref 5–15)
AST SERPL-CCNC: 28 U/L (ref 1–40)
BASOPHILS # BLD AUTO: 0.06 10*3/MM3 (ref 0–0.2)
BASOPHILS NFR BLD AUTO: 0.9 % (ref 0–1.5)
BILIRUB SERPL-MCNC: 0.8 MG/DL (ref 0–1.2)
BUN SERPL-MCNC: 14 MG/DL (ref 6–20)
BUN/CREAT SERPL: 14.7 (ref 7–25)
CALCIUM SPEC-SCNC: 9.9 MG/DL (ref 8.6–10.5)
CHLORIDE SERPL-SCNC: 103 MMOL/L (ref 98–107)
CHOLEST SERPL-MCNC: 139 MG/DL (ref 0–200)
CO2 SERPL-SCNC: 25 MMOL/L (ref 22–29)
CREAT SERPL-MCNC: 0.95 MG/DL (ref 0.76–1.27)
DEPRECATED RDW RBC AUTO: 39 FL (ref 37–54)
EGFRCR SERPLBLD CKD-EPI 2021: 95.1 ML/MIN/1.73
EOSINOPHIL # BLD AUTO: 0.2 10*3/MM3 (ref 0–0.4)
EOSINOPHIL NFR BLD AUTO: 3 % (ref 0.3–6.2)
ERYTHROCYTE [DISTWIDTH] IN BLOOD BY AUTOMATED COUNT: 12 % (ref 12.3–15.4)
FOLATE SERPL-MCNC: >20 NG/ML (ref 4.78–24.2)
GLOBULIN UR ELPH-MCNC: 2.5 GM/DL
GLUCOSE SERPL-MCNC: 85 MG/DL (ref 65–99)
HCT VFR BLD AUTO: 45.7 % (ref 37.5–51)
HDLC SERPL-MCNC: 49 MG/DL (ref 40–60)
HGB BLD-MCNC: 15 G/DL (ref 13–17.7)
IMM GRANULOCYTES # BLD AUTO: 0.02 10*3/MM3 (ref 0–0.05)
IMM GRANULOCYTES NFR BLD AUTO: 0.3 % (ref 0–0.5)
LDLC SERPL CALC-MCNC: 73 MG/DL (ref 0–100)
LDLC/HDLC SERPL: 1.47 {RATIO}
LYMPHOCYTES # BLD AUTO: 2.78 10*3/MM3 (ref 0.7–3.1)
LYMPHOCYTES NFR BLD AUTO: 41.1 % (ref 19.6–45.3)
MCH RBC QN AUTO: 28.7 PG (ref 26.6–33)
MCHC RBC AUTO-ENTMCNC: 32.8 G/DL (ref 31.5–35.7)
MCV RBC AUTO: 87.5 FL (ref 79–97)
MONOCYTES # BLD AUTO: 0.64 10*3/MM3 (ref 0.1–0.9)
MONOCYTES NFR BLD AUTO: 9.5 % (ref 5–12)
NEUTROPHILS NFR BLD AUTO: 3.06 10*3/MM3 (ref 1.7–7)
NEUTROPHILS NFR BLD AUTO: 45.2 % (ref 42.7–76)
NRBC BLD AUTO-RTO: 0 /100 WBC (ref 0–0.2)
PLATELET # BLD AUTO: 256 10*3/MM3 (ref 140–450)
PMV BLD AUTO: 10.4 FL (ref 6–12)
POTASSIUM SERPL-SCNC: 4.6 MMOL/L (ref 3.5–5.2)
PROT SERPL-MCNC: 6.8 G/DL (ref 6–8.5)
PSA SERPL-MCNC: 1.1 NG/ML (ref 0–4)
RBC # BLD AUTO: 5.22 10*6/MM3 (ref 4.14–5.8)
SODIUM SERPL-SCNC: 139 MMOL/L (ref 136–145)
TRIGL SERPL-MCNC: 91 MG/DL (ref 0–150)
TSH SERPL DL<=0.05 MIU/L-ACNC: 2.61 UIU/ML (ref 0.27–4.2)
VIT B12 BLD-MCNC: 1215 PG/ML (ref 211–946)
VLDLC SERPL-MCNC: 17 MG/DL (ref 5–40)
WBC NRBC COR # BLD AUTO: 6.76 10*3/MM3 (ref 3.4–10.8)

## 2025-01-20 PROCEDURE — 90656 IIV3 VACC NO PRSV 0.5 ML IM: CPT | Performed by: NURSE PRACTITIONER

## 2025-01-20 PROCEDURE — 84443 ASSAY THYROID STIM HORMONE: CPT

## 2025-01-20 PROCEDURE — G0103 PSA SCREENING: HCPCS

## 2025-01-20 PROCEDURE — 90471 IMMUNIZATION ADMIN: CPT | Performed by: NURSE PRACTITIONER

## 2025-01-20 PROCEDURE — 80053 COMPREHEN METABOLIC PANEL: CPT

## 2025-01-20 PROCEDURE — 99214 OFFICE O/P EST MOD 30 MIN: CPT | Performed by: NURSE PRACTITIONER

## 2025-01-20 PROCEDURE — 80061 LIPID PANEL: CPT

## 2025-01-20 PROCEDURE — 36415 COLL VENOUS BLD VENIPUNCTURE: CPT

## 2025-01-20 PROCEDURE — 85025 COMPLETE CBC W/AUTO DIFF WBC: CPT

## 2025-01-20 PROCEDURE — 82746 ASSAY OF FOLIC ACID SERUM: CPT

## 2025-01-20 PROCEDURE — 82607 VITAMIN B-12: CPT

## 2025-01-20 RX ORDER — ATORVASTATIN CALCIUM 20 MG/1
20 TABLET, FILM COATED ORAL DAILY
Qty: 90 TABLET | Refills: 1 | Status: SHIPPED | OUTPATIENT
Start: 2025-01-20

## 2025-01-20 RX ORDER — FLUTICASONE PROPIONATE 50 MCG
2 SPRAY, SUSPENSION (ML) NASAL DAILY
Qty: 48 G | Refills: 1 | OUTPATIENT
Start: 2025-01-20

## 2025-01-20 NOTE — PROGRESS NOTES
Chief Complaint  Follow-up (6 months), Hyperlipidemia, Anemia, and Allergies    SUBJECTIVE  Mike Patel presents to [facility]    Hyperlipidemia:  Patient is taking Atorvastatin.  Patient denies nocturnal leg cramps, myalgias.  Patient attempts to maintain a diet low in fat and carbohydrates.    Seasonal Allergies:  Patient is taking Claritin, Flonase, with good control of symptoms.    Anemia:  Patient is taking Ferrous Sulfate on Monday Wednesday and Friday.  Patient denies fatigue, signs of blood loss.    Patient would also like to follow-up on results of MRI abdomen for cysts.    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    Knee swelling 4/1/2023    Right knee    Seasonal allergies     Shoulder joint derangement 2019      Family History   Problem Relation Age of Onset    Colon polyps Mother     COPD Mother     Rashes / Skin problems Mother     Hodgkin's lymphoma Maternal Grandmother     COPD Maternal Grandfather     Colon cancer Neg Hx       Past Surgical History:   Procedure Laterality Date    COLONOSCOPY N/A 9/13/2024    Procedure: COLONOSCOPY WITH COLD SNARE POLYPECTOMY;  Surgeon: Mariano Haji MD;  Location: [facility] END[facility];  Service: Gastroenterology;  Laterality: N/A;  COLON POLYPS    HERNIA REPAIR  1976    VASECTOMY  2013        Current Outpatient Medications:     atorvastatin (LIPITOR) 20 MG tablet, Take 1 tablet by mouth Daily., Disp: 90 tablet, Rfl: 1    fluticasone (FLONASE) 50 MCG/ACT nasal spray, INSTILL 2 SPRAYS INTO EACH NOSTRIL EVERY DAY, Disp: 48 g, Rfl: 1    Iron, Ferrous Sulfate, 325 (65 Fe) MG tablet, , Disp: , Rfl:     loratadine (CLARITIN) 10 MG tablet, loratadine 10 mg oral tablet  "take 1 tablet (10 mg) by oral route once daily   Active, Disp: , Rfl:     Multiple Vitamins-Minerals (MULTIVITAMIN GUMMIES MENS PO), multivitamin oral capsule take 1 capsule by oral route daily   Active, Disp: , Rfl:     vitamin C (ASCORBIC ACID) 250 MG tablet, Take 1 tablet by mouth Daily., Disp: , Rfl:     OBJECTIVE  Vital Signs:   /75 (BP Location: Left arm, Patient Position: Sitting, Cuff Size: Large Adult)   Pulse 66   Temp 97.9 °F (36.6 °C) (Temporal)   Ht 170.2 cm (67\")   Wt 84.4 kg (186 lb)   SpO2 98%   BMI 29.13 kg/m²    Estimated body mass index is 29.13 kg/m² as calculated from the following:    Height as of this encounter: 170.2 cm (67\").    Weight as of this encounter: 84.4 kg (186 lb).     Wt Readings from Last 3 Encounters:   01/20/25 84.4 kg (186 lb)   11/07/24 85.3 kg (188 lb)   09/13/24 83 kg (182 lb 15.7 oz)     BP Readings from Last 3 Encounters:   01/20/25 118/75   11/07/24 116/78   09/13/24 108/72       Physical Exam  Vitals reviewed.   Constitutional:       Appearance: Normal appearance. He is well-developed.   HENT:      Head: Normocephalic and atraumatic.      Right Ear: External ear normal.      Left Ear: External ear normal.   Eyes:      Conjunctiva/sclera: Conjunctivae normal.      Pupils: Pupils are equal, round, and reactive to light.   Cardiovascular:      Rate and Rhythm: Normal rate and regular rhythm.      Heart sounds: No murmur heard.     No friction rub. No gallop.   Pulmonary:      Effort: Pulmonary effort is normal.      Breath sounds: Normal breath sounds. No wheezing or rhonchi.   Skin:     General: Skin is warm and dry.   Neurological:      Mental Status: He is alert and oriented to person, place, and time.      Cranial Nerves: No cranial nerve deficit.   Psychiatric:         Mood and Affect: Mood and affect normal.         Behavior: Behavior normal.         Thought Content: Thought content normal.         Judgment: Judgment normal.          Result Review    CMP  "         4/1/2024    15:10 7/18/2024    14:56 7/24/2024    09:42   CMP   Glucose   88    BUN   15    Creatinine   0.93    EGFR   97.6    Sodium   139    Potassium   4.6    Chloride   106    Calcium   9.1    Total Protein 6.3  7.1  6.7    Albumin 4.5  4.4  4.4    Globulin   2.3    Total Bilirubin 0.3  0.4  0.6    Alkaline Phosphatase 116  112  101    AST (SGOT) 28  16  31    ALT (SGPT) 43  40  36    Albumin/Globulin Ratio   1.9    BUN/Creatinine Ratio   16.1    Anion Gap   10.8      CBC          7/24/2024    09:42 11/7/2024    08:40   CBC   WBC 6.65  7.40    RBC 5.10  5.18    Hemoglobin 14.9  15.3    Hematocrit 44.7  44.8    MCV 87.6  86.5    MCH 29.2  29.5    MCHC 33.3  34.2    RDW 12.2  11.8    Platelets 225  242      Lipid Panel          7/24/2024    09:42   Lipid Panel   Total Cholesterol 133    Triglycerides 89    HDL Cholesterol 48    VLDL Cholesterol 17    LDL Cholesterol  68    LDL/HDL Ratio 1.40      TSH          7/24/2024    09:42   TSH   TSH 1.790      BMP          7/24/2024    09:42   BMP   BUN 15    Creatinine 0.93    Sodium 139    Potassium 4.6    Chloride 106    CO2 22.2    Calcium 9.1          MRI Abdomen With & Without Contrast    Result Date: 1/8/2025  Impression: 1. Multiple splenic cysts similar to prior ultrasound. Several tiny subcapsular cysts noted with trace subcapsular fluid. 2. No acute abnormality in the abdomen. 3. Several hepatic and renal cysts. 4. Additional incidental findings above. Electronically Signed: Cristobal Eli MD  1/8/2025 4:15 PM EST  Workstation ID: [id number]    US Spleen    Result Date: 11/18/2024  Impression: No significant change compared with 8/26/2024. Multiple cystic lesions throughout the spleen appear similar to prior exam largest measuring about 3.3 cm. Follow-up CT or MRI may be useful to further evaluate and ensure no other adenopathy or other associated abnormalities are present. Electronically Signed: Virgilio Sanchez MD  11/18/2024 5:28 PM EST  Workstation ID:  [id number]        The above data has been reviewed by GUY Cox 01/20/2025 07:24 EST.          Patient Care Team:  Maddy Bella APRN as PCP - General (Nurse Practitioner)            ASSESSMENT & PLAN    Diagnoses and all orders for this visit:    1. Thyroid disorder screen (Primary)  -     TSH; Future    2. Prostate cancer screening  -     PSA Screen; Future    3. Hyperlipidemia, unspecified hyperlipidemia type  Overview:  Stable on Lipitor, continue current medication    Orders:  -     Comprehensive Metabolic Panel; Future  -     CBC & Differential; Future  -     Lipid Panel; Future  -     atorvastatin (LIPITOR) 20 MG tablet; Take 1 tablet by mouth Daily.  Dispense: 90 tablet; Refill: 1    4. Iron deficiency anemia, unspecified iron deficiency anemia type  Assessment & Plan:  Stable on 3 times weekly supplement, continue current medication    Orders:  -     Cancel: Iron Profile; Future  -     Vitamin B12 & Folate; Future  -     Cancel: Ferritin; Future    5. Seasonal allergic rhinitis, unspecified trigger  Overview:  Stable and well-controlled Claritin, continue current dose      6. Cyst of spleen  -     Ambulatory Referral to General Surgery    7. Kidney cysts  Assessment & Plan:  Repeat renal ultrasound in 1 year to follow-up on cyst    Orders:  -     US Renal Bilateral; Future    Other orders  -     Fluzone >6mos ([id number])         Tobacco Use: Medium Risk (1/20/2025)    Patient History     Smoking Tobacco Use: Former     Smokeless Tobacco Use: Former     Passive Exposure: Past       Follow Up     Return if symptoms worsen or fail to improve.      Patient was given instructions and counseling regarding his condition or for health maintenance advice. Please see specific information pulled into the AVS if appropriate.   I have reviewed information obtained and documented by others and I have confirmed the accuracy of this documented note.    GUY Cox

## 2025-03-12 ENCOUNTER — OFFICE VISIT (OUTPATIENT)
Dept: SURGERY | Facility: CLINIC | Age: 55
End: 2025-03-12
Payer: OTHER GOVERNMENT

## 2025-03-12 VITALS — HEIGHT: 67 IN | BODY MASS INDEX: 29.19 KG/M2 | RESPIRATION RATE: 16 BRPM | WEIGHT: 186 LBS

## 2025-03-12 DIAGNOSIS — D73.4 CYST OF SPLEEN: Primary | ICD-10-CM

## 2025-03-13 ENCOUNTER — PATIENT ROUNDING (BHMG ONLY) (OUTPATIENT)
Dept: SURGERY | Facility: CLINIC | Age: 55
End: 2025-03-13
Payer: OTHER GOVERNMENT

## 2025-03-13 ENCOUNTER — OFFICE VISIT (OUTPATIENT)
Dept: FAMILY MEDICINE CLINIC | Facility: CLINIC | Age: 55
End: 2025-03-13
Payer: OTHER GOVERNMENT

## 2025-03-13 VITALS
HEART RATE: 66 BPM | OXYGEN SATURATION: 98 % | HEIGHT: 67 IN | DIASTOLIC BLOOD PRESSURE: 83 MMHG | WEIGHT: 185 LBS | SYSTOLIC BLOOD PRESSURE: 114 MMHG | BODY MASS INDEX: 29.03 KG/M2

## 2025-03-13 DIAGNOSIS — J40 BRONCHITIS: ICD-10-CM

## 2025-03-13 DIAGNOSIS — J02.9 SORE THROAT: ICD-10-CM

## 2025-03-13 DIAGNOSIS — R05.1 ACUTE COUGH: Primary | ICD-10-CM

## 2025-03-13 LAB
EXPIRATION DATE: NORMAL
EXPIRATION DATE: NORMAL
FLUAV AG UPPER RESP QL IA.RAPID: NOT DETECTED
FLUBV AG UPPER RESP QL IA.RAPID: NOT DETECTED
INTERNAL CONTROL: NORMAL
INTERNAL CONTROL: NORMAL
Lab: NORMAL
Lab: NORMAL
S PYO AG THROAT QL: NEGATIVE
SARS-COV-2 AG UPPER RESP QL IA.RAPID: NOT DETECTED

## 2025-03-13 PROCEDURE — 87428 SARSCOV & INF VIR A&B AG IA: CPT | Performed by: NURSE PRACTITIONER

## 2025-03-13 PROCEDURE — 99213 OFFICE O/P EST LOW 20 MIN: CPT | Performed by: NURSE PRACTITIONER

## 2025-03-13 PROCEDURE — 87880 STREP A ASSAY W/OPTIC: CPT | Performed by: NURSE PRACTITIONER

## 2025-03-13 RX ORDER — METHYLPREDNISOLONE 4 MG/1
TABLET ORAL
Qty: 1 EACH | Refills: 0 | Status: SHIPPED | OUTPATIENT
Start: 2025-03-13

## 2025-03-13 RX ORDER — DEXTROMETHORPHAN HYDROBROMIDE AND PROMETHAZINE HYDROCHLORIDE 15; 6.25 MG/5ML; MG/5ML
5 SYRUP ORAL 4 TIMES DAILY PRN
Qty: 180 ML | Refills: 0 | Status: SHIPPED | OUTPATIENT
Start: 2025-03-13

## 2025-03-13 NOTE — PROGRESS NOTES
Chief Complaint  Cough, URI, and Sore Throat    ROSHAN Patel presents to [facility] due to Cough, chest congestion, and Sore Throat. Pt denies any fever, states his symptoms have been going on for about 5 days.  States that the cough is worse when he lays down at night.  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    Knee swelling 4/1/2023    Right knee    Seasonal allergies     Shoulder joint derangement 2019      Family History   Problem Relation Age of Onset    Colon polyps Mother     COPD Mother     Rashes / Skin problems Mother     Hodgkin's lymphoma Maternal Grandmother     COPD Maternal Grandfather     Colon cancer Neg Hx       Past Surgical History:   Procedure Laterality Date    COLONOSCOPY N/A 09/13/2024    Procedure: COLONOSCOPY WITH COLD SNARE POLYPECTOMY;  Surgeon: Mariano Haji MD;  Location: [facility] ENDOSCOPY;  Service: Gastroenterology;  Laterality: N/A;  COLON POLYPS    COLONOSCOPY  9/13/2024    HERNIA REPAIR  1976    VASECTOMY  2013        Current Outpatient Medications:     atorvastatin (LIPITOR) 20 MG tablet, Take 1 tablet by mouth Daily., Disp: 90 tablet, Rfl: 1    fluticasone (FLONASE) 50 MCG/ACT nasal spray, INSTILL 2 SPRAYS INTO EACH NOSTRIL EVERY DAY, Disp: 48 g, Rfl: 1    Iron, Ferrous Sulfate, 325 (65 Fe) MG tablet, , Disp: , Rfl:     loratadine (CLARITIN) 10 MG tablet, loratadine 10 mg oral tablet take 1 tablet (10 mg) by oral route once daily   Active, Disp: , Rfl:     Multiple Vitamins-Minerals (MULTIVITAMIN GUMMIES MENS PO), multivitamin oral capsule take 1 capsule by oral route daily   Active, Disp: , Rfl:     vitamin C (ASCORBIC ACID) 250 MG  "tablet, Take 1 tablet by mouth Daily., Disp: , Rfl:     methylPREDNISolone (MEDROL) 4 MG dose pack, Take as directed on package instructions., Disp: 1 each, Rfl: 0    promethazine-dextromethorphan (PROMETHAZINE-DM) 6.25-15 MG/5ML syrup, Take 5 mL by mouth 4 (Four) Times a Day As Needed for Cough., Disp: 180 mL, Rfl: 0    OBJECTIVE  Vital Signs:   /83   Pulse 66   Ht 170.2 cm (67\")   Wt 83.9 kg (185 lb)   SpO2 98%   BMI 28.98 kg/m²    Estimated body mass index is 28.98 kg/m² as calculated from the following:    Height as of this encounter: 170.2 cm (67\").    Weight as of this encounter: 83.9 kg (185 lb).     Wt Readings from Last 3 Encounters:   03/13/25 83.9 kg (185 lb)   03/12/25 84.4 kg (186 lb)   01/20/25 84.4 kg (186 lb)     BP Readings from Last 3 Encounters:   03/13/25 114/83   01/20/25 118/75   11/07/24 116/78       Physical Exam  Vitals reviewed.   Constitutional:       Appearance: Normal appearance. He is well-developed.   HENT:      Head: Normocephalic and atraumatic.      Right Ear: Tympanic membrane, ear canal and external ear normal.      Left Ear: Tympanic membrane, ear canal and external ear normal.      Nose: Congestion present.      Mouth/Throat:      Pharynx: No oropharyngeal exudate or posterior oropharyngeal erythema.      Comments: Clear PND noted  Eyes:      Conjunctiva/sclera: Conjunctivae normal.      Pupils: Pupils are equal, round, and reactive to light.   Cardiovascular:      Rate and Rhythm: Normal rate and regular rhythm.      Heart sounds: No murmur heard.     No friction rub. No gallop.   Pulmonary:      Effort: Pulmonary effort is normal.      Breath sounds: Wheezing present. No rhonchi or rales.      Comments: Some mild wheezing noted  Skin:     General: Skin is warm and dry.   Neurological:      Mental Status: He is alert and oriented to person, place, and time.      Cranial Nerves: No cranial nerve deficit.   Psychiatric:         Mood and Affect: Mood and affect normal.  "        Behavior: Behavior normal.         Thought Content: Thought content normal.         Judgment: Judgment normal.          Result Review    SARS Antigen   Date Value Ref Range Status   03/13/2025 Not Detected Not Detected, Presumptive Negative Final     Influenza A Antigen JF   Date Value Ref Range Status   03/13/2025 Not Detected Not Detected Final     Influenza B Antigen JF   Date Value Ref Range Status   03/13/2025 Not Detected Not Detected Final     Internal Control   Date Value Ref Range Status   03/13/2025 Passed Passed Final     Lot Number   Date Value Ref Range Status   03/13/2025 12,771  Final     Expiration Date   Date Value Ref Range Status   03/13/2025 2/2,026  Final     Strep          3/13/2025    09:17   Common Labs   POC Strep A, Molecular Negative        MRI Abdomen With & Without Contrast  Result Date: 1/8/2025  Impression: 1. Multiple splenic cysts similar to prior ultrasound. Several tiny subcapsular cysts noted with trace subcapsular fluid. 2. No acute abnormality in the abdomen. 3. Several hepatic and renal cysts. 4. Additional incidental findings above. Electronically Signed: Cristobal Eli MD  1/8/2025 4:15 PM EST  Workstation ID: [id number]    US Spleen  Result Date: 11/18/2024  Impression: No significant change compared with 8/26/2024. Multiple cystic lesions throughout the spleen appear similar to prior exam largest measuring about 3.3 cm. Follow-up CT or MRI may be useful to further evaluate and ensure no other adenopathy or other associated abnormalities are present. Electronically Signed: Virgilio Sanchez MD  11/18/2024 5:28 PM EST  Workstation ID: [id number]       The above data has been reviewed by GUY Cox 03/13/2025 08:37 EDT.          Patient Care Team:  Maddy Bella APRN as PCP - General (Nurse Practitioner)            ASSESSMENT & PLAN    Diagnoses and all orders for this visit:    1. Acute cough (Primary)  -     POCT SARS-CoV-2 Antigen JF + Flu  -     POCT  rapid strep A    2. Sore throat  -     POCT rapid strep A    3. Bronchitis  -     methylPREDNISolone (MEDROL) 4 MG dose pack; Take as directed on package instructions.  Dispense: 1 each; Refill: 0  -     promethazine-dextromethorphan (PROMETHAZINE-DM) 6.25-15 MG/5ML syrup; Take 5 mL by mouth 4 (Four) Times a Day As Needed for Cough.  Dispense: 180 mL; Refill: 0    Discussed with patient likely mild bronchitis related to viral URI, we will treat with Medrol Dosepak and cough medication as needed, follow-up if symptoms do not resolve or any development of fever    Tobacco Use: Medium Risk (3/13/2025)    Patient History     Smoking Tobacco Use: Former     Smokeless Tobacco Use: Former     Passive Exposure: Past       Follow Up     Return if symptoms worsen or fail to improve.        Patient was given instructions and counseling regarding his condition or for health maintenance advice. Please see specific information pulled into the AVS if appropriate.   I have reviewed information obtained and documented by others and I have confirmed the accuracy of this documented note.    GUY Cox

## 2025-03-13 NOTE — PROGRESS NOTES
Chief Complaint: Cyst (Cyst of spleen )    Subjective       History of Present Illness    Mike Patel is a 54 y.o. male presents to [facility]   History of Present Illness  The patient presents for evaluation of splenic cysts.    He was incidentally found to have cysts in his spleen, liver, and kidneys on imaging studies. He reports no significant health issues or frequent illnesses. The initial discovery of an elevated liver enzyme led to further investigations, including an ultrasound. Despite these findings, he remains asymptomatic and reports no discomfort. He has been informed about the potential risk of bleeding from a ruptured splenic cyst. He has undergone multiple ultrasounds but is uncertain about any changes in the size of the cysts. He has not had any abdominal surgeries, and his last colonoscopy was uneventful. He also mentions a history of hernia repair.  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    Knee swelling 4/1/2023    Right knee    Seasonal allergies     Shoulder joint derangement 2019       Past Surgical History:   Procedure Laterality Date    COLONOSCOPY N/A 09/13/2024    Procedure: COLONOSCOPY WITH COLD SNARE POLYPECTOMY;  Surgeon: Mariano Haji MD;  Location: [facility] ENDOSCOPY;  Service: Gastroenterology;  Laterality: N/A;  COLON POLYPS    COLONOSCOPY  9/13/2024    HERNIA REPAIR  1976    VASECTOMY  2013         Current Outpatient Medications:     atorvastatin (LIPITOR) 20 MG tablet, Take 1 tablet by mouth Daily., Disp: 90 tablet, Rfl: 1    Iron, Ferrous Sulfate, 325 (65 Fe) MG tablet, , Disp: , Rfl:     loratadine (CLARITIN) 10 MG tablet, loratadine 10 mg oral tablet take 1 tablet (10 mg) by oral  "route once daily   Active, Disp: , Rfl:     Multiple Vitamins-Minerals (MULTIVITAMIN GUMMIES MENS PO), multivitamin oral capsule take 1 capsule by oral route daily   Active, Disp: , Rfl:     vitamin C (ASCORBIC ACID) 250 MG tablet, Take 1 tablet by mouth Daily., Disp: , Rfl:     fluticasone (FLONASE) 50 MCG/ACT nasal spray, INSTILL 2 SPRAYS INTO EACH NOSTRIL EVERY DAY, Disp: 48 g, Rfl: 1    methylPREDNISolone (MEDROL) 4 MG dose pack, Take as directed on package instructions., Disp: 1 each, Rfl: 0    promethazine-dextromethorphan (PROMETHAZINE-DM) 6.25-15 MG/5ML syrup, Take 5 mL by mouth 4 (Four) Times a Day As Needed for Cough., Disp: 180 mL, Rfl: 0    Allergies   Allergen Reactions    Amoxicillin Hives        Family History   Problem Relation Age of Onset    Colon polyps Mother     COPD Mother     Rashes / Skin problems Mother     Hodgkin's lymphoma Maternal Grandmother     COPD Maternal Grandfather     Colon cancer Neg Hx        Social History     Socioeconomic History    Marital status:    Tobacco Use    Smoking status: Former     Current packs/day: 0.00     Average packs/day: 2.0 packs/day for 1 year (2.0 ttl pk-yrs)     Types: Cigarettes     Start date: 2009     Quit date: 2010     Years since quittin.9     Passive exposure: Past    Smokeless tobacco: Former     Types: Chew     Quit date:     Tobacco comments:     started at age 16 and stopped 20's 1 cig per week   Vaping Use    Vaping status: Never Used   Substance and Sexual Activity    Alcohol use: Yes     Alcohol/week: 15.0 standard drinks of alcohol     Types: 15 Cans of beer per week     Comment: on the weekends, occasionally drinks 1 drink per day, has been drinking 21-30 years    Drug use: Never    Sexual activity: Yes     Partners: Female     Birth control/protection: Vasectomy       Vital Signs:   Resp 16   Ht 170.2 cm (67\")   Wt 84.4 kg (186 lb)   BMI 29.13 kg/m²      Physical Exam   Physical Exam  The patient is in no " acute distress.  Breathing is nonlabored.  Abdomen is soft.      Result Review :         Procedures   Results  Imaging  Ultrasound in August 2024 showed a 3.3 cm lesion. Ultrasound in November 2024 showed a 3.1 cm lesion. MRI in January showed the spleen is normal in length at 12 cm. Multiple splenic cysts at the upper aspect of the spleen measuring 3.5 cm and two adjacent cysts at the inferior aspect of the spleen measuring 3.1 and 2.3 cm. Several small subcapsular cysts are noted.           Assessment and Plan   Diagnoses and all orders for this visit:    1. Cyst of spleen (Primary)          Assessment & Plan  1. Asymptomatic splenic cysts.  The patient has multiple splenic cysts, with the largest measuring 3.5 cm and others at 3.1 cm and 2.3 cm. He is currently asymptomatic. Discussed with the patient, all questions were answered. He voiced understanding and agreed to proceed with above plan. An infectious workup will be conducted to rule out infectious causes of the cysts. Consultation with other surgeons and radiologists will be sought to verify the number and size of the cysts and to gather recommendations. If the cysts are noninfectious and remain asymptomatic, periodic monitoring with ultrasound every 6 months to a year is recommended. The patient has been advised to avoid contact sports due to the risk of splenic rupture. If the cysts grow or become symptomatic, further surgical options, including splenectomy, will be considered.    PROCEDURE  The patient underwent a colonoscopy in the past, which was uneventful. He also has a history of hernia repair.       Follow Up   No follow-ups on file.  Patient was given instructions and counseling regarding his condition or for health maintenance advice. Please see specific information pulled into the AVS if appropriate.     Discussed with the patient - all questions were answered they voiced understanding and agreed to proceed with above plan    Patient or patient  representative verbalized consent for the use of Ambient Listening during the visit with  Ritchie Paredes MD for chart documentation. 3/13/2025  14:44 EDT    Ritchie Paredes MD

## 2025-03-13 NOTE — PROGRESS NOTES
Hello, my name is Sagar. I am with [facility], [address], [place] [address]. In an effort to hear the opinions of our patients, I would like to ask you a few questions about your visit with our office.     Can you tell me about your visit with us? What things went well?    We're always looking for ways to make our patients' experiences even better. Do you have any recommendations on ways we may improve?    Overall, were you satisfied with your first visit to our practice?    Is there a particular staff member/s who you would like to recognize for doing a great job?      I appreciate you taking the time to answer these questions. The providers and staff here in our office want you to get the healthcare you need and deserve and we look forward to your comments.     Thank you for your input and have a wonderful day!

## 2025-07-23 ENCOUNTER — LAB (OUTPATIENT)
Dept: LAB | Facility: HOSPITAL | Age: 55
End: 2025-07-23
Payer: OTHER GOVERNMENT

## 2025-07-23 ENCOUNTER — OFFICE VISIT (OUTPATIENT)
Dept: FAMILY MEDICINE CLINIC | Facility: CLINIC | Age: 55
End: 2025-07-23
Payer: OTHER GOVERNMENT

## 2025-07-23 VITALS
HEIGHT: 67 IN | DIASTOLIC BLOOD PRESSURE: 82 MMHG | WEIGHT: 187 LBS | BODY MASS INDEX: 29.35 KG/M2 | HEART RATE: 68 BPM | OXYGEN SATURATION: 98 % | SYSTOLIC BLOOD PRESSURE: 122 MMHG

## 2025-07-23 DIAGNOSIS — Z13.220 LIPID SCREENING: ICD-10-CM

## 2025-07-23 DIAGNOSIS — Z00.00 ANNUAL PHYSICAL EXAM: ICD-10-CM

## 2025-07-23 DIAGNOSIS — D50.9 IRON DEFICIENCY ANEMIA, UNSPECIFIED IRON DEFICIENCY ANEMIA TYPE: ICD-10-CM

## 2025-07-23 DIAGNOSIS — Z13.29 THYROID DISORDER SCREEN: ICD-10-CM

## 2025-07-23 DIAGNOSIS — E78.5 HYPERLIPIDEMIA, UNSPECIFIED HYPERLIPIDEMIA TYPE: ICD-10-CM

## 2025-07-23 DIAGNOSIS — Z00.00 ANNUAL PHYSICAL EXAM: Primary | ICD-10-CM

## 2025-07-23 DIAGNOSIS — J30.9 ALLERGIC RHINITIS, UNSPECIFIED SEASONALITY, UNSPECIFIED TRIGGER: ICD-10-CM

## 2025-07-23 LAB
ALBUMIN SERPL-MCNC: 4.2 G/DL (ref 3.5–5.2)
ALBUMIN/GLOB SERPL: 1.5 G/DL
ALP SERPL-CCNC: 99 U/L (ref 39–117)
ALT SERPL W P-5'-P-CCNC: 40 U/L (ref 1–41)
ANION GAP SERPL CALCULATED.3IONS-SCNC: 10.2 MMOL/L (ref 5–15)
AST SERPL-CCNC: 34 U/L (ref 1–40)
BASOPHILS # BLD AUTO: 0.06 10*3/MM3 (ref 0–0.2)
BASOPHILS NFR BLD AUTO: 0.9 % (ref 0–1.5)
BILIRUB SERPL-MCNC: 0.6 MG/DL (ref 0–1.2)
BUN SERPL-MCNC: 15 MG/DL (ref 6–20)
BUN/CREAT SERPL: 16.3 (ref 7–25)
CALCIUM SPEC-SCNC: 9.5 MG/DL (ref 8.6–10.5)
CHLORIDE SERPL-SCNC: 107 MMOL/L (ref 98–107)
CHOLEST SERPL-MCNC: 135 MG/DL (ref 0–200)
CO2 SERPL-SCNC: 22.8 MMOL/L (ref 22–29)
CREAT SERPL-MCNC: 0.92 MG/DL (ref 0.76–1.27)
DEPRECATED RDW RBC AUTO: 38.4 FL (ref 37–54)
EGFRCR SERPLBLD CKD-EPI 2021: 98.2 ML/MIN/1.73
EOSINOPHIL # BLD AUTO: 0.19 10*3/MM3 (ref 0–0.4)
EOSINOPHIL NFR BLD AUTO: 2.7 % (ref 0.3–6.2)
ERYTHROCYTE [DISTWIDTH] IN BLOOD BY AUTOMATED COUNT: 12 % (ref 12.3–15.4)
GLOBULIN UR ELPH-MCNC: 2.8 GM/DL
GLUCOSE SERPL-MCNC: 91 MG/DL (ref 65–99)
HCT VFR BLD AUTO: 44 % (ref 37.5–51)
HDLC SERPL-MCNC: 53 MG/DL (ref 40–60)
HGB BLD-MCNC: 15.2 G/DL (ref 13–17.7)
IMM GRANULOCYTES # BLD AUTO: 0.02 10*3/MM3 (ref 0–0.05)
IMM GRANULOCYTES NFR BLD AUTO: 0.3 % (ref 0–0.5)
IRON 24H UR-MRATE: 81 MCG/DL (ref 59–158)
IRON SATN MFR SERPL: 20 % (ref 20–50)
LDLC SERPL CALC-MCNC: 62 MG/DL (ref 0–100)
LDLC/HDLC SERPL: 1.14 {RATIO}
LYMPHOCYTES # BLD AUTO: 2.57 10*3/MM3 (ref 0.7–3.1)
LYMPHOCYTES NFR BLD AUTO: 36.8 % (ref 19.6–45.3)
MCH RBC QN AUTO: 30.2 PG (ref 26.6–33)
MCHC RBC AUTO-ENTMCNC: 34.5 G/DL (ref 31.5–35.7)
MCV RBC AUTO: 87.5 FL (ref 79–97)
MONOCYTES # BLD AUTO: 0.6 10*3/MM3 (ref 0.1–0.9)
MONOCYTES NFR BLD AUTO: 8.6 % (ref 5–12)
NEUTROPHILS NFR BLD AUTO: 3.55 10*3/MM3 (ref 1.7–7)
NEUTROPHILS NFR BLD AUTO: 50.7 % (ref 42.7–76)
NRBC BLD AUTO-RTO: 0 /100 WBC (ref 0–0.2)
PLATELET # BLD AUTO: 236 10*3/MM3 (ref 140–450)
PMV BLD AUTO: 10.3 FL (ref 6–12)
POTASSIUM SERPL-SCNC: 4.3 MMOL/L (ref 3.5–5.2)
PROT SERPL-MCNC: 7 G/DL (ref 6–8.5)
RBC # BLD AUTO: 5.03 10*6/MM3 (ref 4.14–5.8)
SODIUM SERPL-SCNC: 140 MMOL/L (ref 136–145)
TIBC SERPL-MCNC: 398 MCG/DL (ref 298–536)
TRANSFERRIN SERPL-MCNC: 267 MG/DL (ref 200–360)
TRIGL SERPL-MCNC: 107 MG/DL (ref 0–150)
TSH SERPL DL<=0.05 MIU/L-ACNC: 1.99 UIU/ML (ref 0.27–4.2)
VLDLC SERPL-MCNC: 20 MG/DL (ref 5–40)
WBC NRBC COR # BLD AUTO: 6.99 10*3/MM3 (ref 3.4–10.8)

## 2025-07-23 PROCEDURE — 36415 COLL VENOUS BLD VENIPUNCTURE: CPT

## 2025-07-23 PROCEDURE — 84443 ASSAY THYROID STIM HORMONE: CPT

## 2025-07-23 PROCEDURE — 80053 COMPREHEN METABOLIC PANEL: CPT

## 2025-07-23 PROCEDURE — 99396 PREV VISIT EST AGE 40-64: CPT | Performed by: NURSE PRACTITIONER

## 2025-07-23 PROCEDURE — 85025 COMPLETE CBC W/AUTO DIFF WBC: CPT

## 2025-07-23 PROCEDURE — 84466 ASSAY OF TRANSFERRIN: CPT

## 2025-07-23 PROCEDURE — 83540 ASSAY OF IRON: CPT

## 2025-07-23 PROCEDURE — 80061 LIPID PANEL: CPT

## 2025-07-23 RX ORDER — ATORVASTATIN CALCIUM 20 MG/1
20 TABLET, FILM COATED ORAL DAILY
Qty: 90 TABLET | Refills: 1 | Status: SHIPPED | OUTPATIENT
Start: 2025-07-23

## 2025-07-23 RX ORDER — FLUTICASONE PROPIONATE 50 MCG
2 SPRAY, SUSPENSION (ML) NASAL DAILY
Qty: 48 G | Refills: 1 | Status: SHIPPED | OUTPATIENT
Start: 2025-07-23 | End: 2025-07-23

## 2025-07-23 NOTE — PROGRESS NOTES
"Chief Complaint  Hyperlipidemia, Anemia, and Allergies, annual exam    SUBJECTIVE  Mike Patel presents to [facility] for annual exam and six month follow up on Hyperlipidemia, Anemia, and Allergies    Pt states he is no longer taking just an iron supplement, he has found a multivitamin with iron in it.       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    Knee swelling 4/1/2023    Right knee    Seasonal allergies     Shoulder joint derangement 2019      Family History   Problem Relation Age of Onset    Colon polyps Mother     COPD Mother     Rashes / Skin problems Mother     Hodgkin's lymphoma Maternal Grandmother     COPD Maternal Grandfather     Colon cancer Neg Hx       Past Surgical History:   Procedure Laterality Date    COLONOSCOPY N/A 09/13/2024    Procedure: COLONOSCOPY WITH COLD SNARE POLYPECTOMY;  Surgeon: Mariano Haji MD;  Location: [facility] ENDOSCOPY;  Service: Gastroenterology;  Laterality: N/A;  COLON POLYPS    COLONOSCOPY  9/13/2024    HERNIA REPAIR  1976    VASECTOMY  2013        Current Outpatient Medications:     atorvastatin (LIPITOR) 20 MG tablet, Take 1 tablet by mouth Daily., Disp: 90 tablet, Rfl: 1    loratadine (CLARITIN) 10 MG tablet, loratadine 10 mg oral tablet take 1 tablet (10 mg) by oral route once daily   Active, Disp: , Rfl:     Multiple Vitamins-Iron (MULTIVITAMIN/IRON PO), Take  by mouth., Disp: , Rfl:     vitamin C (ASCORBIC ACID) 250 MG tablet, Take 1 tablet by mouth Daily., Disp: , Rfl:     Multiple Vitamins-Minerals (MULTIVITAMIN GUMMIES MENS PO), with Iron, Disp: , Rfl:     OBJECTIVE  Vital Signs:   /82   Pulse 68   Ht 170.2 cm (67\")   Wt 84.8 kg (187 lb)   SpO2 " "98%   BMI 29.29 kg/m²    Estimated body mass index is 29.29 kg/m² as calculated from the following:    Height as of this encounter: 170.2 cm (67\").    Weight as of this encounter: 84.8 kg (187 lb).     Wt Readings from Last 3 Encounters:   07/23/25 84.8 kg (187 lb)   03/13/25 83.9 kg (185 lb)   03/12/25 84.4 kg (186 lb)     BP Readings from Last 3 Encounters:   07/23/25 122/82   03/13/25 114/83   01/20/25 118/75       Physical Exam  Vitals reviewed.   Constitutional:       General: He is not in acute distress.     Appearance: Normal appearance. He is well-developed. He is not diaphoretic.   HENT:      Head: Normocephalic and atraumatic. Hair is normal.      Right Ear: Hearing, tympanic membrane, ear canal and external ear normal.      Left Ear: Hearing, tympanic membrane, ear canal and external ear normal.      Nose: Nose normal. No nasal deformity.      Mouth/Throat:      Mouth: Mucous membranes are moist. No oral lesions.      Pharynx: Uvula midline. No uvula swelling.   Eyes:      General: Lids are normal. No scleral icterus.        Right eye: No discharge.         Left eye: No discharge.      Extraocular Movements: Extraocular movements intact.      Right eye: Normal extraocular motion and no nystagmus.      Left eye: Normal extraocular motion and no nystagmus.      Conjunctiva/sclera: Conjunctivae normal.      Pupils: Pupils are equal, round, and reactive to light.   Neck:      Thyroid: No thyromegaly.      Vascular: No JVD.   Cardiovascular:      Rate and Rhythm: Normal rate and regular rhythm.      Pulses: Normal pulses.      Heart sounds: Normal heart sounds. No murmur heard.     No friction rub. No gallop.   Pulmonary:      Effort: Pulmonary effort is normal. No respiratory distress.      Breath sounds: Normal breath sounds. No wheezing, rhonchi or rales.   Chest:      Chest wall: No tenderness.   Abdominal:      General: Bowel sounds are normal. There is no distension.      Palpations: Abdomen is soft. " There is no mass.      Tenderness: There is no abdominal tenderness. There is no guarding.      Hernia: No hernia is present.   Musculoskeletal:         General: No tenderness or deformity. Normal range of motion.      Cervical back: Normal range of motion and neck supple.   Lymphadenopathy:      Cervical: No cervical adenopathy.   Skin:     General: Skin is warm and dry.      Findings: No rash.   Neurological:      Mental Status: He is alert and oriented to person, place, and time.      Cranial Nerves: No cranial nerve deficit.      Coordination: Coordination normal.      Deep Tendon Reflexes: Reflexes are normal and symmetric. Reflexes normal.   Psychiatric:         Mood and Affect: Mood and affect normal.         Behavior: Behavior normal.         Thought Content: Thought content normal.         Judgment: Judgment normal.          Result Review    CMP          1/20/2025    08:06   CMP   Glucose 85    BUN 14    Creatinine 0.95    EGFR 95.1    Sodium 139    Potassium 4.6    Chloride 103    Calcium 9.9    Total Protein 6.8    Albumin 4.3    Globulin 2.5    Total Bilirubin 0.8    Alkaline Phosphatase 107    AST (SGOT) 28    ALT (SGPT) 30    Albumin/Globulin Ratio 1.7    BUN/Creatinine Ratio 14.7    Anion Gap 11.0      CBC          11/7/2024    08:40 1/20/2025    08:06   CBC   WBC 7.40  6.76    RBC 5.18  5.22    Hemoglobin 15.3  15.0    Hematocrit 44.8  45.7    MCV 86.5  87.5    MCH 29.5  28.7    MCHC 34.2  32.8    RDW 11.8  12.0    Platelets 242  256      Lipid Panel          1/20/2025    08:06   Lipid Panel   Total Cholesterol 139    Triglycerides 91    HDL Cholesterol 49    VLDL Cholesterol 17    LDL Cholesterol  73    LDL/HDL Ratio 1.47      TSH          1/20/2025    08:06   TSH   TSH 2.610      Electrolytes          1/20/2025    08:06   Electrolytes   Sodium 139    Potassium 4.6    Chloride 103    Calcium 9.9              No Images in the past 120 days found..     The above data has been reviewed by Maddy  GUY Bella 07/23/2025 07:39 EDT.          Patient Care Team:  Maddy Bella APRN as PCP - General (Nurse Practitioner)            ASSESSMENT & PLAN    Diagnoses and all orders for this visit:    1. Annual physical exam (Primary)  -     Comprehensive Metabolic Panel; Future  -     CBC & Differential; Future  -     Lipid Panel; Future  -     TSH Rfx On Abnormal To Free T4; Future  -     Iron Profile w/o Ferritin; Future    2. Lipid screening  -     Lipid Panel; Future    3. Thyroid disorder screen  -     TSH Rfx On Abnormal To Free T4; Future    4. Iron deficiency anemia, unspecified iron deficiency anemia type  -     Iron Profile w/o Ferritin; Future    5. Hyperlipidemia, unspecified hyperlipidemia type  Overview:  Stable on Lipitor, continue current medication    Orders:  -     atorvastatin (LIPITOR) 20 MG tablet; Take 1 tablet by mouth Daily.  Dispense: 90 tablet; Refill: 1    6. Allergic rhinitis, unspecified seasonality, unspecified trigger  Overview:  Stable and well-controlled Claritin, continue current dose    Orders:  -     Discontinue: fluticasone (FLONASE) 50 MCG/ACT nasal spray; 2 sprays by Each Nare route Daily.  Dispense: 48 g; Refill: 1         Tobacco Use: Medium Risk (7/23/2025)    Patient History     Smoking Tobacco Use: Former     Smokeless Tobacco Use: Former     Passive Exposure: Past     The patient is advised to attempt to lose weight, continue current medications, continue current healthy lifestyle patterns, and return for routine annual checkups.    Follow Up     Return in about 6 months (around 1/23/2026), or if symptoms worsen or fail to improve.        Patient was given instructions and counseling regarding his condition or for health maintenance advice. Please see specific information pulled into the AVS if appropriate.   I have reviewed information obtained and documented by others and I have confirmed the accuracy of this documented note.    GUY Cox

## 2025-07-24 ENCOUNTER — RESULTS FOLLOW-UP (OUTPATIENT)
Dept: LAB | Facility: HOSPITAL | Age: 55
End: 2025-07-24
Payer: OTHER GOVERNMENT

## (undated) DEVICE — SOLIDIFIER LIQLOC PLS 1500CC BT

## (undated) DEVICE — Device

## (undated) DEVICE — Device: Brand: DEFENDO AIR/WATER/SUCTION AND BIOPSY VALVE

## (undated) DEVICE — SOL IRRG H2O PL/BG 1000ML STRL

## (undated) DEVICE — THE SINGLE USE ETRAP – POLYP TRAP IS USED FOR SUCTION RETRIEVAL OF ENDOSCOPICALLY REMOVED POLYPS.: Brand: ETRAP

## (undated) DEVICE — SNAR E/S POLYP SNAREMASTER OVL/10MM 2.8X2300MM YEL